# Patient Record
Sex: MALE | Race: WHITE | Employment: OTHER | URBAN - METROPOLITAN AREA
[De-identification: names, ages, dates, MRNs, and addresses within clinical notes are randomized per-mention and may not be internally consistent; named-entity substitution may affect disease eponyms.]

---

## 2018-01-18 ENCOUNTER — OFFICE VISIT (OUTPATIENT)
Dept: INTERNAL MEDICINE CLINIC | Age: 78
End: 2018-01-18

## 2018-01-18 VITALS
HEIGHT: 66 IN | HEART RATE: 68 BPM | SYSTOLIC BLOOD PRESSURE: 131 MMHG | RESPIRATION RATE: 12 BRPM | BODY MASS INDEX: 29.57 KG/M2 | OXYGEN SATURATION: 98 % | WEIGHT: 184 LBS | DIASTOLIC BLOOD PRESSURE: 76 MMHG | TEMPERATURE: 98 F

## 2018-01-18 DIAGNOSIS — Z13.220 SCREENING, LIPID: ICD-10-CM

## 2018-01-18 DIAGNOSIS — N40.1 ENLARGED PROSTATE WITH URINARY RETENTION: ICD-10-CM

## 2018-01-18 DIAGNOSIS — R33.8 ENLARGED PROSTATE WITH URINARY RETENTION: ICD-10-CM

## 2018-01-18 DIAGNOSIS — I49.3 PVC (PREMATURE VENTRICULAR CONTRACTION): ICD-10-CM

## 2018-01-18 DIAGNOSIS — E78.5 HYPERLIPIDEMIA, UNSPECIFIED HYPERLIPIDEMIA TYPE: ICD-10-CM

## 2018-01-18 DIAGNOSIS — D64.9 ANEMIA, UNSPECIFIED TYPE: Primary | ICD-10-CM

## 2018-01-18 DIAGNOSIS — D22.30 ATYPICAL NEVUS OF FACE: ICD-10-CM

## 2018-01-18 DIAGNOSIS — Z98.890 S/P MITRAL VALVE REPAIR: ICD-10-CM

## 2018-01-18 DIAGNOSIS — I34.1 MITRAL VALVE PROLAPSE: ICD-10-CM

## 2018-01-18 DIAGNOSIS — L57.8 SUN-DAMAGED SKIN: ICD-10-CM

## 2018-01-18 RX ORDER — CHOLECALCIFEROL (VITAMIN D3) 125 MCG
200 CAPSULE ORAL DAILY
COMMUNITY

## 2018-01-18 NOTE — PROGRESS NOTES
HISTORY OF PRESENT ILLNESS    New patient to my practice, referred to me by self internet. Prior medical care has been from Dr. Jamal Dockery in [place].  he works as[employer] for [employer]. was in [facility] . his  past medical history was reviewed, discussed, and summarized in the list below. Review of Systems   All other systems reviewed and are negative, except as noted in HPI      Past Medical and Surgical History  Past Medical History:   Diagnosis Date    Anemia     hx of anemia due to food intolerance    Enlarged prostate with urinary retention     s/p ?TURP 7/2017 Dr. Yamil Mtz in [place]    GERD (gastroesophageal reflux disease)     Hyperlipidemia     took simvastatin. reluctant to take medication    Mitral valve prolapse     repair 2/2012. [address] (premature ventricular contraction) 2017    saw cardiology in [place]. echo 8/2017. was referred due to presumed bradycardia at urology procedure    S/P mitral valve repair 02/2012    Sun-damaged skin     Wheat intolerance     plus [place], [place]. saw allergist.  states not gluten intolerance. has a past surgical history that includes hx mitral valvuloplasty (02/2012); hx turp (07/2017); and hx colonoscopy. Current Outpatient Prescriptions   Medication Sig    FOLIC ACID/MULTIVIT-MIN/LUTEIN (CENTRUM SILVER PO) Take  by mouth.  co-enzyme Q-10 (CO Q-10) 100 mg capsule Take 200 mg by mouth daily.  ASPIRIN (ASPIR-81 PO) Take  by mouth.  PROCYANIDOLIC OLIGOMERS (PYCNOGENOL PO) Take  by mouth.  GLUCOSAM/CHOND/COLLAGEN/HYALUR (JOINT SUPPORT PO) Take  by mouth. No current facility-administered medications for this visit. reports that he quit smoking about 48 years ago.  He has never used smokeless tobacco. He reports that he drinks about 1.2 oz of alcohol per week     family history includes Cancer in his brother; Diabetes in his brother; Heart Disease (age of onset: 46) in his father. Physical Exam   Nursing note and vitals reviewed. Blood pressure 131/76, pulse 68, temperature 98 °F (36.7 °C), resp. rate 12, height 5' 6\" (1.676 m), weight 184 lb (83.5 kg), SpO2 98 %. Constitutional: oriented to person, place, and time. No distress. Eyes: Conjunctivae are normal.   HEENT:  No cervical lymphadenopathy. No thyroid nodules or goiter  Cardiovascular: Normal rate. Regular rhythm, no murmurs, rubs. No edema  Pulmonary/Chest: Effort normal. clear to auscultation  Abdominal: soft, non-tender, non-distended  Musculoskeletal:     Neurological: Alert and oriented to person, place. Cranial nerves grossly intact. Normal gait   Skin: No rash noted. Psychiatric: Normal mood and affect. Behavior is normal.     ASSESSMENT and PLAN  Diagnoses and all orders for this visit:    1. Anemia, unspecified type - past hx. Presumed due to iron malabsorption  -     METABOLIC PANEL, COMPREHENSIVE  -     CBC WITH AUTOMATED DIFF  -     IRON PROFILE    2. Enlarged prostate with urinary retention - Controlled on current regimen. Continue current medications as written in chart. 3. Mitral valve prolapse  4. S/P mitral valve repair  Appear to be doing well. Currently asymptomatic. He would like a colonoscopy  -     [facility]    5. PVC (premature ventricular contraction)  -     METABOLIC PANEL, COMPREHENSIVE  -     [facility]    6. Screening, lipid  -     LIPID PANEL    7. Sun-damaged skin  8. Atypical nevus of face  Scaly lesion of left lateral face   -     REFERRAL TO DERMATOLOGY    9. Hyperlipidemia, unspecified hyperlipidemia type  Reluctant to start statin again.   Check labs    lab results and schedule of future lab studies reviewed with patient  reviewed medications and side effects in detail    Follow-up Disposition: Not on File

## 2018-01-18 NOTE — MR AVS SNAPSHOT
[address] [place] [address] 
 
 
 [address] [place] [address] 
[phone] Patient: Blanca Tripp MRN: [id number] ODX:3/8/1126 Visit Information Date & Time Provider Department Dept. Phone Encounter #  
 1/18/2018 11:00 AM Lois Paul MD [facility] [address] [facility] [id number] Upcoming Health Maintenance Date Due DTaP/Tdap/Td series (1 - Tdap) 8/5/1960 ZOSTER VACCINE AGE 60> 6/5/1999 GLAUCOMA SCREENING Q2Y 8/5/2004 Pneumococcal 65+ Low/Medium Risk (1 of 2 - PCV13) 8/5/2004 MEDICARE YEARLY EXAM 8/5/2004 Influenza Age 5 to Adult 8/1/2017 Allergies as of 1/18/2018  Review Complete On: 1/18/2018 By: Lois Paul MD  
  
 Severity Noted Reaction Type Reactions Pcn [Penicillins]  01/18/2018    Hives Current Immunizations  Reviewed on 1/18/2018 Name Date Influenza Vaccine 12/1/2017 Reviewed by Velasquez Young on 1/18/2018 at 11:10 AM  
You Were Diagnosed With   
  
 Codes Comments Screening, lipid    -  Primary ICD-10-CM: L44.306 ICD-9-CM: V77.91 Anemia, unspecified type     ICD-10-CM: D64.9 ICD-9-CM: 285.9 Enlarged prostate with urinary retention     ICD-10-CM: N40.1, R33.8 ICD-9-CM: 600.01, 788.20 Mitral valve prolapse     ICD-10-CM: I34.1 ICD-9-CM: 424.0 S/P mitral valve repair     ICD-10-CM: N24.172 ICD-9-CM: V45.89 PVC (premature ventricular contraction)     ICD-10-CM: I49.3 ICD-9-CM: 427.69 Sun-damaged skin     ICD-10-CM: L57.8 ICD-9-CM: 692.79 Atypical nevus of face     ICD-10-CM: D22.30 ICD-9-CM: 216.3 Vitals BP Pulse Temp Resp Height(growth percentile) Weight(growth percentile) 131/76 (BP 1 Location: Left arm, BP Patient Position: Sitting) 68 98 °F (36.7 °C) 12 5' 6\" (1.676 m) 184 lb (83.5 kg) SpO2 BMI Smoking Status 98% 29.7 kg/m2 Former Smoker Vitals History BMI and BSA Data Body Mass Index Body Surface Area 29.7 kg/m 2 1.97 m 2 [facility]erred Pharmacy Pharm[facility] Name Phone [id number] [address] [place] [address] [phone] Your Updated Medication List  
  
   
This list is accurate as of: 1/18/18 12:29 PM.  Always use your most recent med list.  
  
  
  
  
 ASPIR-81 PO Take  by mouth. CENTRUM SILVER PO Take  by mouth. co-enzyme Q-10 100 mg capsule Commonly known as:  CO Q-10 Take 200 mg by mouth daily. JOINT SUPPORT PO Take  by mouth. PYCNOGENOL PO Take  by mouth. We Performed the Following CBC WITH AUTOMATED DIFF [90196 CPT(R)] IRON PROFILE [id number] CPT(R)] LIPID PANEL [14685 CPT(R)] METABOLIC PANEL, COMPREHENSIVE [28799 CPT(R)] REFERRAL TO CARDIOLOGY [TZM35 Custom] REFERRAL TO DERMATOLOGY [REF19 Custom] Referral Information Referral ID Referred By Referred To  
  
 [id number] REGINA [facility] [phone] [address] Phone: [phone] Fax: [phone] Visits Status Start Date End Date 1 New Request 1/18/18 1/18/19 If your referral has a status of pending review or denied, additional information will be sent to support the outcome of this decision. Referral ID Referred By Referred To  
 [id number] Moreno TAPIA MD  
   [address] Phone: [phone] Fax: [phone] Visits Status Start Date End Date 1 New Request 1/18/18 1/18/19 If your referral has a status of pending review or denied, additional information will be sent to support the outcome of this decision. Introducing [facility]! Jacinto Mon introduces [employer] patient portal. Now you can access parts of your medical record, email your doctor's office, and request medication refills online. 1. In your internet browser, go to [email or web address] 2. Click on the First Time User? Click Here link in the Sign In box. You will see the New Member Sign Up page. 3. Enter your [employer] Access Code exactly as it appears below. You will not need to use this code after youve completed the sign-up process. If you do not sign up before the expiration date, you must request a new code. · [employer] Access Code: [email or web address] Expires: 4/18/2018 11:01 AM 
 
4. Enter the last four digits of your Social Security Number (xxxx) and Date of Birth (mm/dd/yyyy) as indicated and click Submit. You will be taken to the next sign-up page. 5. Create a [employer] ID. This will be your [employer] login ID and cannot be changed, so think of one that is secure and easy to remember. 6. Create a [employer] password. You can change your password at any time. 7. Enter your Password Reset Question and Answer. This can be used at a later time if you forget your password. 8. Enter your e-mail address. You will receive e-mail notification when new information is available in [address] 9. Click Sign Up. You can now view and download portions of your medical record. 10. Click the Download Summary menu link to download a portable copy of your medical information. If you have questions, please visit the Frequently Asked Questions section of the [employer] website. Remember, [employer] is NOT to be used for urgent needs. For medical emergencies, dial 911. Now available from your iPhone and Android! Please provide this summary of care documentation to your next provider. Your primary care clinician is listed as Karol Lane. If you have any questions after today's visit, please call [phone].

## 2018-01-23 ENCOUNTER — HOSPITAL ENCOUNTER (OUTPATIENT)
Dept: LAB | Age: 78
Discharge: HOME | End: 2018-01-23
Payer: MEDICARE

## 2018-01-23 PROCEDURE — 80061 LIPID PANEL: CPT

## 2018-01-23 PROCEDURE — 85025 COMPLETE CBC W/AUTO DIFF WBC: CPT

## 2018-01-23 PROCEDURE — 80053 COMPREHEN METABOLIC PANEL: CPT

## 2018-01-23 PROCEDURE — 36415 COLL VENOUS BLD VENIPUNCTURE: CPT

## 2018-01-23 PROCEDURE — 83550 IRON BINDING TEST: CPT

## 2018-01-24 LAB
ALBUMIN SERPL-MCNC: 4.3 G/DL (ref 3.5–4.8)
ALBUMIN/GLOB SERPL: 1.8 {RATIO} (ref 1.2–2.2)
ALP SERPL-CCNC: 63 IU/L (ref 39–117)
ALT SERPL-CCNC: 15 IU/L (ref 0–44)
AST SERPL-CCNC: 17 IU/L (ref 0–40)
BASOPHILS # BLD AUTO: 0 X10E3/UL (ref 0–0.2)
BASOPHILS NFR BLD AUTO: 0 %
BILIRUB SERPL-MCNC: 0.6 MG/DL (ref 0–1.2)
BUN SERPL-MCNC: 20 MG/DL (ref 8–27)
BUN/CREAT SERPL: 16 (ref 10–24)
CALCIUM SERPL-MCNC: 9 MG/DL (ref 8.6–10.2)
CHLORIDE SERPL-SCNC: 100 MMOL/L (ref 96–106)
CHOLEST SERPL-MCNC: 280 MG/DL (ref 100–199)
CO2 SERPL-SCNC: 25 MMOL/L (ref 18–29)
CREAT SERPL-MCNC: 1.23 MG/DL (ref 0.76–1.27)
EOSINOPHIL # BLD AUTO: 0.2 X10E3/UL (ref 0–0.4)
EOSINOPHIL NFR BLD AUTO: 3 %
ERYTHROCYTE [DISTWIDTH] IN BLOOD BY AUTOMATED COUNT: 14.1 % (ref 12.3–15.4)
GLOBULIN SER CALC-MCNC: 2.4 G/DL (ref 1.5–4.5)
GLUCOSE SERPL-MCNC: 102 MG/DL (ref 65–99)
HCT VFR BLD AUTO: 41.8 % (ref 37.5–51)
HDLC SERPL-MCNC: 47 MG/DL
HGB BLD-MCNC: 14 G/DL (ref 13–17.7)
IMM GRANULOCYTES # BLD: 0 X10E3/UL (ref 0–0.1)
IMM GRANULOCYTES NFR BLD: 0 %
INTERPRETATION, 910389: NORMAL
INTERPRETATION: NORMAL
IRON SATN MFR SERPL: 40 % (ref 15–55)
IRON SERPL-MCNC: 120 UG/DL (ref 38–169)
LDLC SERPL CALC-MCNC: 210 MG/DL (ref 0–99)
LYMPHOCYTES # BLD AUTO: 3.6 X10E3/UL (ref 0.7–3.1)
LYMPHOCYTES NFR BLD AUTO: 51 %
MCH RBC QN AUTO: 31.8 PG (ref 26.6–33)
MCHC RBC AUTO-ENTMCNC: 33.5 G/DL (ref 31.5–35.7)
MCV RBC AUTO: 95 FL (ref 79–97)
MONOCYTES # BLD AUTO: 0.6 X10E3/UL (ref 0.1–0.9)
MONOCYTES NFR BLD AUTO: 9 %
NEUTROPHILS # BLD AUTO: 2.6 X10E3/UL (ref 1.4–7)
NEUTROPHILS NFR BLD AUTO: 37 %
PDF IMAGE, 910387: NORMAL
PLATELET # BLD AUTO: 270 X10E3/UL (ref 150–379)
POTASSIUM SERPL-SCNC: 5 MMOL/L (ref 3.5–5.2)
PROT SERPL-MCNC: 6.7 G/DL (ref 6–8.5)
RBC # BLD AUTO: 4.4 X10E6/UL (ref 4.14–5.8)
SODIUM SERPL-SCNC: 137 MMOL/L (ref 134–144)
TIBC SERPL-MCNC: 297 UG/DL (ref 250–450)
TRIGL SERPL-MCNC: 113 MG/DL (ref 0–149)
UIBC SERPL-MCNC: 177 UG/DL (ref 111–343)
VLDLC SERPL CALC-MCNC: 23 MG/DL (ref 5–40)
WBC # BLD AUTO: 6.9 X10E3/UL (ref 3.4–10.8)

## 2018-02-14 ENCOUNTER — OFFICE VISIT (OUTPATIENT)
Dept: CARDIOLOGY CLINIC | Age: 78
End: 2018-02-14

## 2018-02-14 VITALS
SYSTOLIC BLOOD PRESSURE: 118 MMHG | HEIGHT: 66 IN | HEART RATE: 72 BPM | BODY MASS INDEX: 28.93 KG/M2 | WEIGHT: 180 LBS | DIASTOLIC BLOOD PRESSURE: 70 MMHG

## 2018-02-14 DIAGNOSIS — I34.0 NON-RHEUMATIC MITRAL REGURGITATION: Primary | ICD-10-CM

## 2018-02-14 DIAGNOSIS — Z98.890 S/P MITRAL VALVE REPAIR: ICD-10-CM

## 2018-02-14 DIAGNOSIS — E78.5 DYSLIPIDEMIA: ICD-10-CM

## 2018-02-14 DIAGNOSIS — I34.1 MITRAL VALVE PROLAPSE: ICD-10-CM

## 2018-02-14 RX ORDER — ADHESIVE BANDAGE
15 BANDAGE TOPICAL DAILY
COMMUNITY

## 2018-02-14 NOTE — PROGRESS NOTES
Visit Vitals    /70    Pulse 72    Ht 5' 6\" (1.676 m)    Wt 180 lb (81.6 kg)    BMI 29.05 kg/m2

## 2018-02-14 NOTE — PROGRESS NOTES
Gael Melara MD [facility]  [address], [facility], [address] [facility]    Office [phone]  Fax [phone]  Cell [phone]        Karol Tinsley is a 65 y.o. male referred for evaluation and management of mitral valve disease. Consult requested by Heraclio Landeros MD.      Assessment  Encounter Diagnoses   Name Primary?  S/P mitral valve repair     Non-rheumatic mitral regurgitation Yes    Dyslipidemia        Recommendations:    Kaorl Tinsley has MVP with MR s/p repair 2012. He has no murmur of MR on exam and has no exertional sxs. No hx of AF. Will obtain records from Dr. Chico James in [place]. Update echo in 6 months and then annually thereafter. He has a familiar dyslipidemia and has been on and off statins for years. He reports digestive sxs at higher doses. It would be reasonable to resume statin therapy and perhaps couple it with Zetia. Defer to Dr. Geovanny Guzman. Follow-up Disposition:  Return in about 6 months (around 8/14/2018). Subjective:    He underwent mitral valve repair at the [facility] 2012 due to severe MR/MVP. No records available. He states cath at that time demonstrated no plaque. He was on cholesterol medication at that time. He last underwent echo by Dr. Azucena Kern in [place]. He denies any AF before or after MVR. He feels well overall. He plays golf 3 times a week and does housework without any exertional sxs. Patient denies any exertional chest pain, dyspnea, palpitations, syncope, orthopnea, edema or paroxysmal nocturnal dyspnea. He notes he had digestive issues with higher doses of statins in the past.     Patient recently moved from [place]. Cardiac risk factors   HTN no  DM no  Smoking no    Cardiac testing  No specialty comments available.     Past Medical History:   Diagnosis Date    Anemia     hx of anemia due to food intolerance    Enlarged prostate with urinary retention     s/p ?TURP 7/2017 Dr. Herminia Rodas in [address] [place] [address] GERD (gastroesophageal reflux disease)     Hyperlipidemia     took simvastatin. reluctant to take medication    Hyperlipidemia     Mitral valve prolapse     repair 2/2012. [address] (premature ventricular contraction) 2017    saw cardiology in [place]. echo 8/2017. was referred due to presumed bradycardia at urology procedure    S/P mitral valve repair 02/2012    Sun-damaged skin     Wheat intolerance     plus [place], [place]. saw allergist.  states not gluten intolerance. Current Outpatient Prescriptions   Medication Sig Dispense Refill    magnesium hydroxide (CHAO MILK OF MAGNESIA) 400 mg/5 mL suspension Take 15 mL by mouth daily.  FOLIC ACID/MULTIVIT-MIN/LUTEIN (CENTRUM SILVER PO) Take  by mouth.  co-enzyme Q-10 (CO Q-10) 100 mg capsule Take 200 mg by mouth daily.  ASPIRIN (ASPIR-81 PO) Take  by mouth.  PROCYANIDOLIC OLIGOMERS (PYCNOGENOL PO) Take  by mouth.  GLUCOSAM/CHOND/COLLAGEN/HYALUR (JOINT SUPPORT PO) Take  by mouth. Allergies   Allergen Reactions    Other Food Other (comments)     Grains- constipations    Beef Containing Products Other (comments)     constipation    Lactose Other (comments)     constipation      Pcn [Penicillins] Hives          Review of Systems  Constitutional: Negative for fever, chills, malaise/fatigue and diaphoresis. Respiratory: Negative for cough, hemoptysis, sputum production, shortness of breath and wheezing. Cardiovascular: Negative for chest pain, palpitations, orthopnea, claudication, leg swelling and PND. Gastrointestinal: Negative for heartburn, nausea, vomiting, blood in stool and melena. Genitourinary: Negative for dysuria and flank pain. Musculoskeletal: Negative for joint pain and back pain. Skin: Negative for rash. Neurological: Negative for focal weakness, seizures, loss of consciousness, weakness and headaches.   Endo/Heme/Allergies: Does not bruise/bleed easily. Psychiatric/Behavioral: Negative for memory loss. The patient does not have insomnia. Physical Exam    Visit Vitals    /70    Pulse 72    Ht 5' 6\" (1.676 m)    Wt 180 lb (81.6 kg)    BMI 29.05 kg/m2     Wt Readings from Last 3 Encounters:   02/14/18 180 lb (81.6 kg)   01/18/18 184 lb (83.5 kg)      General - well developed well nourished  Neck - JVP normal, thyroid nl  Cardiac - normal S1, S2, no murmurs, rubs or gallops.  No clicks  Vascular - carotids without bruits, radials, femorals and pedal pulses equal bilateral  Lungs - clear to auscultation bilaterals, no rales, wheezing or rhonchi  Abd - soft nontender, no HSM, no abd bruits  Extremities - no edema  Skin - no rash  Neuro - nonfocal  Psych - normal mood and affect      Cardiographics  EKG 2/14/18- SR, normal    Written by Alfie Mora, as dictated by Caleb Pate MD.  Jimmie Varela

## 2018-02-14 NOTE — MR AVS SNAPSHOT
[address] [phone] Patient: Sari Estrada MRN: [id number] EAI:[birth year] Visit Information Date & Time Provider Department Dept. Phone Encounter #  
 2/14/2018 10:00 AM Lynne Alonso MD [facility] [phone] [id number] Follow-up Instructions Return in about 6 months (around 8/14/2018). Your Appointments 3/7/2018  8:30 AM  
Office Visit with ALLI Valiente [address] [facility]) Appt Note: NP skin exam left side of face . .mailed paperwork [address] [address] [address] [facility] [id number] [place] [address] [address] [facility] [address] Upcoming Health Maintenance Date Due DTaP/Tdap/Td series (1 - Tdap) 8/5/1960 ZOSTER VACCINE AGE 60> 6/5/1999 GLAUCOMA SCREENING Q2Y 8/5/2004 Pneumococcal 65+ Low/Medium Risk (1 of 2 - PCV13) 8/5/2004 MEDICARE YEARLY EXAM 8/5/2004 Allergies as of 2/14/2018  Review Complete On: 2/14/2018 By: Candice Dunne LPN Severity Noted Reaction Type Reactions Other Food  02/14/2018    Other (comments) Grains- constipations Beef Containing Products  02/14/2018    Other (comments)  
 constipation Lactose  02/14/2018    Other (comments)  
 constipation Pcn [Penicillins]  01/18/2018    Hives Current Immunizations  Reviewed on 1/18/2018 Name Date Influenza Vaccine 12/1/2017 Not reviewed this visit You Were Diagnosed With   
  
 Codes Comments PVC's (premature ventricular contractions)    -  Primary ICD-10-CM: I49.3 ICD-9-CM: 427.69 Mitral valve prolapse     ICD-10-CM: I34.1 ICD-9-CM: 424.0 Hyperlipidemia, unspecified hyperlipidemia type     ICD-10-CM: E78.5 ICD-9-CM: 272.4 S/P mitral valve repair     ICD-10-CM: Q95.226 ICD-9-CM: V45.89 Vitals BP Pulse Height(growth percentile) Weight(growth percentile) BMI Smoking Status 118/70 72 5' 6\" (1.676 m) 180 lb (81.6 kg) 29.05 kg/m2 Former Smoker Vitals History BMI and BSA Data Body Mass Index Body Surface Area 29.05 kg/m 2 1.95 m 2 Preferred Pharmacy Pharmacy Name Phone [id number] [address] [place] [address] [phone] Your Updated Medication List  
  
   
This list is accurate as of: 2/14/18 10:40 AM.  Always use your most recent med list.  
  
  
  
  
 ASPIR-81 PO Take  by mouth. CENTRUM SILVER PO Take  by mouth. co-enzyme Q-10 100 mg capsule Commonly known as:  CO Q-10 Take 200 mg by mouth daily. JOINT SUPPORT PO Take  by mouth. CHAO MILK OF MAGNESIA 400 mg/5 mL suspension Generic drug:  magnesium hydroxide Take 15 mL by mouth daily. PYCNOGENOL PO Take  by mouth. We Performed the Following AMB POC EKG ROUTINE W/ 12 LEADS, INTER & REP [56570 CPT(R)] Follow-up Instructions Return in about 6 months (around 8/14/2018). Introducing hospitals [facility]! Dear Esthela Estrada: Thank you for requesting a [employer] account. Our records indicate that you already have an active [employer] account. You can access your account anytime at [email or web address]. [email or web address] Did you know that you can access your hospital and ER discharge instructions at any time in [employer]ociety? You can also review all of your test results from your hospital stay or ER visit. Additional Information If you have questions, please visit the Frequently Asked Questions section of the [employer] website at [email or web address] Remember, [employer] is NOT to be used for urgent needs. For medical emergencies, dial 911. Now available from your iPhone and Android! Please provide this summary of care documentation to your next provider. Your primary care clinician is listed as Karol Lane. If you have any questions after today's visit, please call [phone].

## 2018-03-07 ENCOUNTER — OFFICE VISIT (OUTPATIENT)
Dept: DERMATOLOGY | Facility: AMBULATORY SURGERY CENTER | Age: 78
End: 2018-03-07

## 2018-03-07 ENCOUNTER — HOSPITAL ENCOUNTER (OUTPATIENT)
Dept: LAB | Age: 78
Discharge: HOME | End: 2018-03-07

## 2018-03-07 VITALS
HEART RATE: 69 BPM | BODY MASS INDEX: 28.93 KG/M2 | WEIGHT: 180 LBS | HEIGHT: 66 IN | DIASTOLIC BLOOD PRESSURE: 78 MMHG | SYSTOLIC BLOOD PRESSURE: 130 MMHG | OXYGEN SATURATION: 97 % | RESPIRATION RATE: 16 BRPM | TEMPERATURE: 98.4 F

## 2018-03-07 DIAGNOSIS — D22.9 MULTIPLE BENIGN NEVI: ICD-10-CM

## 2018-03-07 DIAGNOSIS — L82.1 OTHER SEBORRHEIC KERATOSIS: ICD-10-CM

## 2018-03-07 DIAGNOSIS — L57.8 SUN-DAMAGED SKIN: Primary | ICD-10-CM

## 2018-03-07 DIAGNOSIS — L57.0 ACTINIC KERATOSIS: ICD-10-CM

## 2018-03-07 DIAGNOSIS — D18.01 CHERRY ANGIOMA: ICD-10-CM

## 2018-03-07 DIAGNOSIS — D48.5 NEOPLASM OF UNCERTAIN BEHAVIOR OF SKIN OF BACK: ICD-10-CM

## 2018-03-07 DIAGNOSIS — L81.4 LENTIGINES: ICD-10-CM

## 2018-03-07 NOTE — PROGRESS NOTES
Written by Eduardo Church, as dictated by Juve Wood, [address]. Name: Catarina Adair       Age: 65 y.o. Date: 3/7/2018    Chief Complaint:   Chief Complaint   Patient presents with    Skin Exam       Subjective:     HPI:  Mr.. Catarina Adair is a 65 y.o. male who presents for the evaluation of a lesion on the left lateral cheek. The patient was referred by Dr. Chante White for this evaluation. He states that the lesion is raised during the summer time. The lesion has been present for a couple years. The patient has not had prior treatment for this lesion. Associated symptoms include pain. ROS: Consitutional: Negative  Dermatological : positive for - skin lesion changes      Social History     Social History    Marital status:      Spouse name: Paulina Her Number of children: 3    Years of education: N/A     Occupational History    Not on file. Social History Main Topics    Smoking status: Former Smoker     Quit date: 1970    Smokeless tobacco: Never Used    Alcohol use 1.2 oz/week     2 Glasses of wine per week    Drug use: Not on file    Sexual activity: Not on file     Other Topics Concern    Not on file     Social History Narrative       Family History   Problem Relation Age of Onset    Heart Disease Father 46      age 46    [de-identified] Brother      kidney    Diabetes Brother        Past Medical History:   Diagnosis Date    Anemia     hx of anemia due to food intolerance    Enlarged prostate with urinary retention     s/p ?TURP 2017 Dr. Cynthia Mcgowan in [place]    GERD (gastroesophageal reflux disease)     Hyperlipidemia     took simvastatin. reluctant to take medication    Hyperlipidemia     Mitral valve prolapse     repair [address]. [address] (premature ventricular contraction) 2017    saw cardiology in [place]. echo 2017.  was referred due to presumed bradycardia at urology procedure    S/P mitral valve repair 2012    Sun-damaged skin     Wheat intolerance     plus Simon, Barley. saw allergist.  states not gluten intolerance. Past Surgical History:   Procedure Laterality Date    HX COLONOSCOPY      reports done [id number] range in [place]. Dr. Andrew Sanchez  02/2012    [place]ral [place] [facility] [place] Cody Favorite    HX TURP  07/2017    laser? Dr. Aly Boyer       Current Outpatient Prescriptions   Medication Sig Dispense Refill    magnesium hydroxide (CHAO MILK OF MAGNESIA) 400 mg/5 mL suspension Take 15 mL by mouth daily.  FOLIC ACID/MULTIVIT-MIN/LUTEIN (CENTRUM SILVER PO) Take  by mouth.  co-enzyme Q-10 (CO Q-10) 100 mg capsule Take 200 mg by mouth daily.  ASPIRIN (ASPIR-81 PO) Take  by mouth.  PROCYANIDOLIC OLIGOMERS (PYCNOGENOL PO) Take  by mouth.  GLUCOSAM/CHOND/COLLAGEN/HYALUR (JOINT SUPPORT PO) Take  by mouth. Allergies   Allergen Reactions    Other Food Other (comments)     Grains- constipations    Beef Containing Products Other (comments)     constipation    Lactose Other (comments)     constipation      Pcn [Penicillins] Hives         Objective:    Visit Vitals    /78 (BP 1 Location: Right arm, BP Patient Position: Sitting)    Pulse 69    Temp 98.4 °F (36.9 °C) (Oral)    Resp 16    Ht 5' 6\" (1.676 m)    Wt 180 lb (81.6 kg)    SpO2 97%    BMI 29.05 kg/m2       Rico Hunter is a 65 y.o. male who appears well and in no distress. He is awake, alert, and oriented. There is no preauricular, submandibular, or cervical lymphadenopathy. A limited skin examination was completed including his face, ears, neck, back, chest, abdomen, and upper extremities. He has sun damage on his face and ears - pink scaly diffuse patches consistent with actinic keratoses. There is no definitive lesion on the left cheek. There are scattered waxy macules and keratotic papules consistent with seborrheic keratoses.  He has a 1.5 cm darkly pigmented keratotic papule on his left upper back consistent with an inflamed seborrheic keratosis. There are pink intradermal nevi and brown junctional nevi, no concerning features. There are lentigines on sun exposed areas. He has scattered red papules consistent with cherry angiomas. Assessment/Plan:    Sun damage, actinic keratoses, face and ears. The diagnosis was discussed. We discussed the options of Efudex or PDT. He has elected to proceed with PDT today. He will repeat this in 4 weeks as well. Seborrheic keratoses. The diagnosis was reviewed and the patient was reassured that no treatment is needed for these benign lesions. Neoplasm of Uncertain Behavior, left upper back, favor inflamed SK. The differential diagnoses were discussed. A shave removal was advised to address this lesion. The procedure was reviewed and verbal and written consent were obtained. The risks of pain, bleeding, infection, recurrence and scar were discussed. I performed the procedure. The site was cleansed and anesthetized with 1% Lidocaine with Epinephrine 1:100,000. A shave removal was performed to remove the lesion in its clinical entirety. Drysol was used for hemostasis. The wound was bandaged and care reviewed. The specimen was sent to pathology. I will contact the patient with the results and any further treatment that may be necessary. Normal nevi. The diagnosis of normal nevi was reviewed. I discussed sun protection, sunscreen use, the warning signs of skin cancer, the need for self-skin examinations, and the need for regular practitioner exams every 1 year. The patient should follow up sooner as needed if new, changing, or symptomatic skin lesions arise. Solar lentigos. The diagnosis and relationship to sun exposure was reviewed. Sun protection advised. Cherry angiomas. The diagnosis was reviewed and the patient was reassured that no treatment is needed for these benign lesions.     This plan was reviewed with the patient and patient agrees. All questions were answered. This scribe documentation was reviewed by me and accurately reflects the examination and decisions made by me. [facility]   OFFICE PROCEDURE PROGRESS NOTE   Chart reviewed for the following:   Renzo EVANS, have reviewed the History, Physical and updated the Allergic reactions for Ely Jacinto. TIME OUT performed immediately prior to start of procedure:   Dana EVANS, have performed the following reviews on Ely Jacinto   prior to the start of the procedure:     * Patient was identified by name and date of birth   * Agreement on procedure being performed was verified   * Risks and Benefits explained to the patient   * Procedure site verified and marked as necessary   * Patient was positioned for comfort   * Verbal consent was obtained. Time: 8:54 AM   Date of procedure: 3/7/2018  Procedure performed by: Mildred Cm  Provider assisted by: Greg Barrera LPN    Patient assisted by: self   How tolerated by patient: tolerated the procedure well with no complications   Comments: none            Chief Complaint   Patient presents with    Skin Exam       Visit Vitals    /78 (BP 1 Location: Right arm, BP Patient Position: Sitting)    Pulse 69    Temp 98.4 °F (36.9 °C) (Oral)    Resp 16    Ht 5' 6\" (1.676 m)    Wt 81.6 kg (180 lb)    SpO2 97%    BMI 29.05 kg/m2       Ely Jacinto was seen today for Mauricio-U therapy of actinic keratoses located on the face and ears. The treatment and post-procedure care were reviewed, verbal and written consent were obtained. The skin was prepped in the usual manner using alcohol wipes to degrease the surface. The Levulan was applied to the face and ears, and incubated for 90 minutes. After 90 minutes the skin was illuminated with Mauricio-U light for 1000 seconds. Eye protection was used.   Ely Jacinto experienced moderate symptoms of burnign during illumination. A fan was used during the procedure to assist in the reduction or tolerance of symptoms. Sunscreen was applied to the skin, a hat was worn, and Yulissa Estrada was discharged in good condition. Follow up will be in 4 weeks for round 2. [facility]   OFFICE PROCEDURE PROGRESS NOTE   Chart reviewed for the following:   Renzo EVANS, have reviewed the History, Physical and updated the Allergic reactions for Yulissa Estrada. TIME OUT performed immediately prior to start of procedure:   Dana EVANS, have performed the following reviews on Yulissa Baconlist   prior to the start of the procedure:     * Patient was identified by name and date of birth   * Agreement on procedure being performed was verified   * Risks and Benefits explained to the patient   * Procedure site verified and marked as necessary   * Patient was positioned for comfort   * Consent was signed and verified     Time: 2929  Date of procedure: 3/7/2018  Procedure performed by: Charly Rand.  Fabiola Juan  Provider assisted by: lpn   Patient assisted by: self   How tolerated by patient: tolerated the procedure well with no complications   Comments: none

## 2018-03-07 NOTE — MR AVS SNAPSHOT
[address] [facility] [address] [place] [address] [address] 
[phone] Patient: Ayaan Callaway MRN: [id number] CSD:0/7/2303 Visit Information Date & Time Provider Department Dept. Phone Encounter #  
 3/7/2018  8:30 AM ALLI Barton [phone] Your Appointments 8/27/2018  9:00 AM  
ECHO CARDIOGRAMS 2D with ECHO, STFRANCIS  
[facility] (QASIM SCHEDULING) Appt Note: 6 mo fup echo at 9 at 10 20 dr Cody Oh Roberto 600 [facility] [address]  
[phone]  
  
   
 [address] [place] [address]  
  
    
 8/27/2018 10:20 AM  
ESTABLISHED PATIENT with Brady Saavedra MD  
[facility] ([address]) Appt Note: 6 mo fup echo at 9 at 10 20 dr Cody Oh Roberto [address] [place] [address] Upcoming Health Maintenance Date Due DTaP/Tdap/Td series (1 - Tdap) 8/5/1960 ZOSTER VACCINE AGE 60> 6/5/1999 GLAUCOMA SCREENING Q2Y 8/5/2004 Pneumococcal 65+ Low/Medium Risk (1 of 2 - PCV13) 8/5/2004 MEDICARE YEARLY EXAM 8/5/2004 Allergies as of 3/7/2018  Review Complete On: 3/7/2018 By: Madelaine Tracy LPN Severity Noted Reaction Type Reactions Other Food  02/14/2018    Other (comments) Grains- constipations Beef Containing Products  02/14/2018    Other (comments)  
 constipation Lactose  02/14/2018    Other (comments)  
 constipation Pcn [Penicillins]  01/18/2018    Hives Current Immunizations  Reviewed on 1/18/2018 Name Date Influenza Vaccine 12/1/2017 Not reviewed this visit Vitals BP Pulse Temp Resp Height(growth percentile) Weight(growth percentile)  130/78 (BP 1 Location: Right arm, BP Patient Position: Sitting) 69 98.4 °F (36.9 °C) (Oral) 16 5' 6\" (1.676 m) 180 lb (81.6 kg) SpO2 BMI Smoking Status 97% 29.05 kg/m2 Former Smoker BMI and BSA Data Body Mass Index Body Surface Area 29.05 kg/m 2 1.95 m 2 Preferred Pharmacy Pharmacy Name Phone [id number] Am[address] [phone] Your Updated Medication List  
  
   
This list is accurate as of 3/7/18  8:31 AM.  Always use your most recent med list.  
  
  
  
  
 ASPIR-81 PO Take  by mouth. CENTRUM SILVER PO Take  by mouth. co-enzyme Q-10 100 mg capsule Commonly known as:  CO Q-10 Take 200 mg by mouth daily. JOINT SUPPORT PO Take  by mouth. CHAO MILK OF MAGNESIA 400 mg/5 mL suspension Generic drug:  magnesium hydroxide Take 15 mL by mouth daily. PYCNOGENOL PO Take  by mouth. Patient Instructions Self Skin Exam and Sunscreens Early detection and treatment is essential in the treatment of all forms of skin cancer. If caught early, all forms of skin cancer are curable. In addition to your regular visits, you should perform a monthly skin examination. Over time, you become familiar with what is normally found on your skin and can identify new or suspicious spots. One of the screening tools you can use to assess your skin is to follow the ABCDEs: 
 
A= Asymmetry (One half is unlike the other half) B= Border (An irregular, scalloped or poorly defined edge) C= Color (Is varied from one area to another, has shades of tan, brown/ black,       white, red or blue) D= Diameter (Spots larger than 6mm or a pencil eraser) E= Evolving (New spots or one that is changing in size, shape, or color) A follow- up interval will be customized based on your history of skin cancer or level of skin damage and risk factors. In any case, if you notice a suspicious or new spot, an appointment should be arranged between regular visits. Everyone should use sunscreen and sun-safe practices, which is especially important for those with a personal or family history of skin cancer. Suggestions for this include: 1. Use daily moisturizers containing SPF 30 or higher. 2. Wear long sleeve clothing with UPF ratings and a broad-brimmed hat. 3. Apply sunscreen with SPF 30 or higher to all sun exposed areas if you are going to be in the sun. A broad spectrum UVA/ UVB sunscreen is best.  Dont forget to REAPPLY every two hours or more often if swimming or sweating! 4. Avoid outside activities during peak sun hours, especially in the summer (10am- 2pm). 5. DO NOT use tanning beds. Using sunscreen and sun-safe practices can help reduce the likelihood of developing skin cancer or additional skin cancers in those previously diagnosed. Introducing [facility]! Dear Palmer Muller: Thank you for requesting a [employer] account. Our records indicate that you already have an active [employer] account. You can access your account anytime at [email or web address] Did you know that you can access your hospital and ER discharge instructions at any time in [employer]? You can also review all of your test results from your hospital stay or ER visit. Additional Information If you have questions, please visit the Frequently Asked Questions section of the [employer] website at [email or web address]. Remember, [employer] is NOT to be used for urgent needs. For medical emergencies, dial 911. Now available from your iPhone and Android! Please provide this summary of care documentation to your next provider. Your primary care clinician is listed as [facility]. If you have any questions after today's visit, please call [phone].

## 2018-04-18 ENCOUNTER — OFFICE VISIT (OUTPATIENT)
Dept: DERMATOLOGY | Facility: AMBULATORY SURGERY CENTER | Age: 78
End: 2018-04-18

## 2018-04-18 VITALS
DIASTOLIC BLOOD PRESSURE: 74 MMHG | TEMPERATURE: 98.2 F | RESPIRATION RATE: 14 BRPM | HEART RATE: 86 BPM | OXYGEN SATURATION: 98 % | SYSTOLIC BLOOD PRESSURE: 128 MMHG

## 2018-04-18 DIAGNOSIS — L57.0 ACTINIC KERATOSIS: Primary | ICD-10-CM

## 2018-04-18 NOTE — PROGRESS NOTES
Chief Complaint   Patient presents with    Other     mauricio light       Visit Vitals    /74    Pulse 86    Temp 98.2 °F (36.8 °C)    Resp 14    SpO2 98%       Matthew Kaba was seen today for Mauricio-U therapy of actinic keratoses located on the face and ears. The treatment and post-procedure care were reviewed, verbal and written consent were obtained. The skin was prepped in the usual manner using alcohol wipes to degrease the surface. The Levulan was applied to the face and ears, and incubated for 90 minutes. After 90 minutes the skin was illuminated with Mauricio-U light for 1000 seconds. Eye protection was used. Matthew Kaba experienced moderate symptoms of burning during illumination. A fan was used during the procedure to assist in the reduction or tolerance of symptoms. Sunscreen was applied to the skin, a hat was worn, and Matthew Kaba was discharged in good condition. Follow up will be at next exam.        [address] [facility]   OFFICE PROCEDURE PROGRESS NOTE   Chart reviewed for the following:   Renzo EVANS, have reviewed the History, Physical and updated the Allergic reactions for Matthew Kaba. TIME OUT performed immediately prior to start of procedure:   Dana EVANS, have performed the following reviews on Melvinaertjay jay Kaba   prior to the start of the procedure:     * Patient was identified by name and date of birth   * Agreement on procedure being performed was verified   * Risks and Benefits explained to the patient   * Procedure site verified and marked as necessary   * Patient was positioned for comfort   * Consent was signed and verified     Time: 5500  Date of procedure: 4/18/2018  Procedure performed by: Colletta Romans.  Nikkie Tarango  Provider assisted by: lpn   Patient assisted by: self   How tolerated by patient: tolerated the procedure well with no complications   Comments: none

## 2018-05-29 ENCOUNTER — OFFICE VISIT (OUTPATIENT)
Dept: DERMATOLOGY | Facility: AMBULATORY SURGERY CENTER | Age: 78
End: 2018-05-29

## 2018-05-29 VITALS
SYSTOLIC BLOOD PRESSURE: 120 MMHG | BODY MASS INDEX: 28.93 KG/M2 | WEIGHT: 180 LBS | HEART RATE: 69 BPM | HEIGHT: 66 IN | OXYGEN SATURATION: 97 % | TEMPERATURE: 98.5 F | DIASTOLIC BLOOD PRESSURE: 78 MMHG | RESPIRATION RATE: 18 BRPM

## 2018-05-29 DIAGNOSIS — D22.9 MULTIPLE BENIGN NEVI: ICD-10-CM

## 2018-05-29 DIAGNOSIS — D18.01 CHERRY ANGIOMA: ICD-10-CM

## 2018-05-29 DIAGNOSIS — L82.0 INFLAMED SEBORRHEIC KERATOSIS: ICD-10-CM

## 2018-05-29 DIAGNOSIS — L82.1 SEBORRHEIC KERATOSES: ICD-10-CM

## 2018-05-29 DIAGNOSIS — L57.0 ACTINIC KERATOSIS: Primary | ICD-10-CM

## 2018-05-29 NOTE — MR AVS SNAPSHOT
[address] [facility] [address] [place] [address] [address] 
[phone] Patient: Claudeen Calkins MRN: [id number] QIR:2/2/3198 Visit Information Date & Time Provider Department Dept. Phone Encounter #  
 5/29/2018  9:00 AM Oly Veliz NP [facility] [phone] [id number] Your Appointments 8/27/2018  9:00 AM  
ECHO CARDIOGRAMS 2D with ECHO, STFRANCIS  
[facility] (QASIM SCHEDULING) Appt Note: 6 mo fup echo at 9 at 10 20 dr Evan Smith Roberto 600 [facility] [address]  
[phone]  
  
   
 [address] [address]  
  
    
 8/27/2018 10:20 AM  
ESTABLISHED PATIENT with Charlie Medina MD  
[facility] ([facility]) Appt Note: 6 mo fup echo at 9 at 10 20 dr Evan Smith Roberto 600 [address] [address]  
[phone]  
  
   
 [address] [address]  
  
    
 9/12/2018  2:15 PM  
Office Visit with Oly Veliz NP [facility]) Appt Note: 3rd Mauricio-u treatment [facility] [address] [facility] [address]  
[address] [facility] [address] [place] [address] Upcoming Health Maintenance Date Due DTaP/Tdap/Td series (1 - Tdap) 8/5/1960 ZOSTER VACCINE AGE 60> 6/5/1999 GLAUCOMA SCREENING Q2Y 8/5/2004 Pneumococcal 65+ Low/Medium Risk (1 of 2 - PCV13) 8/5/2004 MEDICARE YEARLY EXAM 3/14/2018 Influenza Age 5 to Adult 8/1/2018 Allergies as of 5/29/2018  Review Complete On: 5/29/2018 By: Gloria Alanis Severity Noted Reaction Type Reactions Other Food  02/14/2018    Other (comments) Grains- constipations Beef Containing Products  02/14/2018    Other (comments)  
 constipation Lactose  02/14/2018    Other (comments) constipation Pcn [Penicillins]  01/18/2018    Hives Current Immunizations  Reviewed on 1/18/2018 Name Date Influenza Vaccine 12/1/2017 Not reviewed this visit Vitals BP Pulse Temp Resp Height(growth percentile) Weight(growth percentile) 120/78 (BP 1 Location: Left arm, BP Patient Position: Sitting) 69 98.5 °F (36.9 °C) (Oral) 18 5' 6\" (1.676 m) 180 lb (81.6 kg) SpO2 BMI Smoking Status 97% 29.05 kg/m2 Former Smoker Vitals History BMI and BSA Data Body Mass Index Body Surface Area 29.05 kg/m 2 1.95 m 2 Preferred Pharmacy Pharmacy Name Phone [id number] Lucho Blake [address] Your Updated Medication List  
  
   
This list is accurate as of 5/29/18  9:07 AM.  Always use your most recent med list.  
  
  
  
  
 ASPIR-81 PO Take  by mouth. CENTRUM SILVER PO Take  by mouth. co-enzyme Q-10 100 mg capsule Commonly known as:  CO Q-10 Take 200 mg by mouth daily. JOINT SUPPORT PO Take  by mouth. CHAO MILK OF MAGNESIA 400 mg/5 mL suspension Generic drug:  magnesium hydroxide Take 15 mL by mouth daily. PYCNOGENOL PO Take  by mouth. Patient Instructions Self Skin Exam and Sunscreens Early detection and treatment is essential in the treatment of all forms of skin cancer. If caught early, all forms of skin cancer are curable. In addition to your regular visits, you should perform a monthly skin examination. Over time, you become familiar with what is normally found on your skin and can identify new or suspicious spots. One of the screening tools you can use to assess your skin is to follow the ABCDEs: 
 
A= Asymmetry (One half is unlike the other half) B= Border (An irregular, scalloped or poorly defined edge) C= Color (Is varied from one area to another, has shades of tan, brown/ black,       white, red or blue) D= Diameter (Spots larger than 6mm or a pencil eraser) E= Evolving (New spots or one that is changing in size, shape, or color) A follow- up interval will be customized based on your history of skin cancer or level of skin damage and risk factors. In any case, if you notice a suspicious or new spot, an appointment should be arranged between regular visits. Everyone should use sunscreen and sun-safe practices, which is especially important for those with a personal or family history of skin cancer. Suggestions for this include: 1. Use daily moisturizers containing SPF 30 or higher. 2. Wear long sleeve clothing with UPF ratings and a broad-brimmed hat. 3. Apply sunscreen with SPF 30 or higher to all sun exposed areas if you are going to be in the sun. A broad spectrum UVA/ UVB sunscreen is best.  Dont forget to REAPPLY every two hours or more often if swimming or sweating! 4. Avoid outside activities during peak sun hours, especially in the summer (10am- 2pm). 5. DO NOT use tanning beds. Using sunscreen and sun-safe practices can help reduce the likelihood of developing skin cancer or additional skin cancers in those previously diagnosed. Introducing [facility]! Dear Sabrina Olivera: Thank you for requesting a [employer] account. Our records indicate that you already have an active [employer] account. You can access your account anytime at [email or web address] Did you know that you can access your hospital and ER discharge instructions at any time in [employer]? You can also review all of your test results from your hospital stay or ER visit. Additional Information If you have questions, please visit the Frequently Asked Questions section of the [employer] website at [email or web address] Remember, [employer] is NOT to be used for urgent needs. For medical emergencies, dial 911. Now available from your iPhone and Android! Please provide this summary of care documentation to your next provider. Your primary care clinician is listed as Haydee Hallman. If you have any questions after today's visit, please call [phone].

## 2018-05-29 NOTE — PROGRESS NOTES
Chief Complaint   Patient presents with    Skin Exam     spots on lower back/sides     1. Have you been to the ER, urgent care clinic since your last visit? Hospitalized since your last visit? No    2. Have you seen or consulted any other health care providers outside of the [address] [place] [address] since your last visit? Include any pap smears or colon screening. [facility] 5/2018.

## 2018-05-29 NOTE — PROGRESS NOTES
Name: Ken Saul       Age: 65 y.o. Date: 2018    Chief Complaint:   Chief Complaint   Patient presents with    Skin Exam     spots on lower back/sides       Subjective:    HPI  Mr. Ken Saul is a 65 y.o. male who presents for a skin exam.  The patient's last skin exam was a few months ago and then he underwent two rounds of PDT. He is very happy with the results. He notes residual possible lesion in the right ear. The patient notes scaly lesions on his back that he would like treated. The patient's pertinent skin history includes : AK s/p PDT 3/18,     ROS: Constitutional: Negative. Dermatological : positive for skin lesion changes      Social History     Social History    Marital status:      Spouse name: Alexandro Guzman Number of children: 3    Years of education: N/A     Occupational History    Not on file. Social History Main Topics    Smoking status: Former Smoker     Quit date: 1970    Smokeless tobacco: Never Used    Alcohol use 1.2 oz/week     2 Glasses of wine per week    Drug use: Not on file    Sexual activity: Not on file     Other Topics Concern    Not on file     Social History Narrative       Family History   Problem Relation Age of Onset    Heart Disease Father 46      age 46    [de-identified] Brother      kidney    Diabetes Brother        Past Medical History:   Diagnosis Date    Anemia     hx of anemia due to food intolerance    Enlarged prostate with urinary retention     s/p ?TURP 2017 Dr. Mg Celis in [place]    GERD (gastroesophageal reflux disease)     Hyperlipidemia     took simvastatin. reluctant to take medication    Hyperlipidemia     Mitral valve prolapse     repair [address]. [address] (premature ventricular contraction) 2017    saw cardiology in [place]. echo 2017.  was referred due to presumed bradycardia at urology procedure    S/P mitral valve repair 2012    Sun-damaged skin     Wheat intolerance     plus [place], [place]. saw allergist.  states not gluten intolerance. Past Surgical History:   Procedure Laterality Date    HX COLONOSCOPY      reports done [id number] range in [address] Dr. Chan Flank  02/2012    mitral valve repair, [facility] Dr. Monserrat Shah    HX TURP  07/2017    laser? Dr. Hailey Davis       Current Outpatient Prescriptions   Medication Sig Dispense Refill    magnesium hydroxide (CHAO MILK OF MAGNESIA) 400 mg/5 mL suspension Take 15 mL by mouth daily.  FOLIC ACID/MULTIVIT-MIN/LUTEIN (CENTRUM SILVER PO) Take  by mouth.  co-enzyme Q-10 (CO Q-10) 100 mg capsule Take 200 mg by mouth daily.  ASPIRIN (ASPIR-81 PO) Take  by mouth.  PROCYANIDOLIC OLIGOMERS (PYCNOGENOL PO) Take  by mouth.  GLUCOSAM/CHOND/COLLAGEN/HYALUR (JOINT SUPPORT PO) Take  by mouth. Allergies   Allergen Reactions    Other Food Other (comments)     Grains- constipations    Beef Containing Products Other (comments)     constipation    Lactose Other (comments)     constipation      Pcn [Penicillins] Hives         Objective:    Visit Vitals    /78 (BP 1 Location: Left arm, BP Patient Position: Sitting)    Pulse 69    Temp 98.5 °F (36.9 °C) (Oral)    Resp 18    Ht 5' 6\" (1.676 m)    Wt 81.6 kg (180 lb)    SpO2 97%    BMI 29.05 kg/m2       Charlotte Stiles is a 65 y.o. male who appears well and in no distress. He is awake, alert, and oriented. There is no preauricular, submandibular, or cervical lymphadenopathy. A skin examination was performed including his scalp, face (including eyelids), ears, neck, chest, back, abdomen, upper extremities (including digits/nails), breast.  There is been a vast improvement in the number of actinic keratoses noted on the prior examination. He has residual actinic keratosis noted on the left tragus and left earlobe as well as the right antihelix.   His scattered stuck on waxy macules and keratotic papules consistent with seborrheic keratoses, including a few on his back that are partially excoriated. There is a well-healed scar in the left upper back with no evidence of lesion recurrence. There are scattered cherry angiomas. He has medium brown junctional and pink intradermal nevi without concerning features for severe atypia. Assessment/Plan:  1. Actinic Keratoses. The diagnosis of this precancerous lesion related to sun exposure was reviewed. Verbal consent was obtained. I treated 3 lesions with cryotherapy and post-cryotherapy care was reviewed. 2.Seborrheic keratoses. The diagnosis was reviewed and the patient was reassured that no treatment is needed for these benign lesions. 3.Normal nevi. The diagnosis of normal nevi was reviewed. I discussed sun protection, sunscreen use, the warning signs of skin cancer, the need for self-skin examinations, and the need for regular practitioner exams every 1 year. The patient should follow up sooner as needed if new, changing, or symptomatic skin lesions arise. 4. Cherry angiomas. The diagnosis was reviewed and the patient was reassured that no treatment is needed for these benign lesions. 5.Inflamed seborrheic keratoses. The diagnosis and treatment with liquid nitrogen cryotherapy were reviewed. The risk or persistence or recurrence of the keratosis and the potential for pigment change at the treated site were reviewed. Verbal consent was obtained. I treated 5 lesions with cryotherapy and care was reviewed. [facility]   OFFICE PROCEDURE PROGRESS NOTE   Chart reviewed for the following:   Renzo EVANS, have reviewed the History, Physical and updated the Allergic reactions for Tasha Lock. TIME OUT performed immediately prior to start of procedure:   Dana EVANS, have performed the following reviews on Tasha Lock   prior to the start of the procedure:     * Patient was identified by name and date of birth   * Agreement on procedure being performed was verified   * Risks and Benefits explained to the patient   * Procedure site verified and marked as necessary   * Patient was positioned for comfort   * Verbal consent was obtained. Time: 0920  Date of procedure: 5/29/2018  Procedure performed by: Rose Fabry.  Roxy Carbajal DNP  Provider assisted by: none   Patient assisted by: self   How tolerated by patient: tolerated the procedure well with no complications   Comments: none

## 2018-08-27 ENCOUNTER — OFFICE VISIT (OUTPATIENT)
Dept: CARDIOLOGY CLINIC | Age: 78
End: 2018-08-27

## 2018-08-27 ENCOUNTER — CLINICAL SUPPORT (OUTPATIENT)
Dept: CARDIOLOGY CLINIC | Age: 78
End: 2018-08-27

## 2018-08-27 VITALS
HEIGHT: 66 IN | DIASTOLIC BLOOD PRESSURE: 78 MMHG | HEART RATE: 56 BPM | SYSTOLIC BLOOD PRESSURE: 118 MMHG | WEIGHT: 181 LBS | OXYGEN SATURATION: 96 % | RESPIRATION RATE: 16 BRPM | BODY MASS INDEX: 29.09 KG/M2

## 2018-08-27 DIAGNOSIS — E78.5 DYSLIPIDEMIA: ICD-10-CM

## 2018-08-27 DIAGNOSIS — I34.1 MITRAL VALVE PROLAPSE: ICD-10-CM

## 2018-08-27 DIAGNOSIS — Z98.890 S/P MITRAL VALVE REPAIR: ICD-10-CM

## 2018-08-27 DIAGNOSIS — I34.0 NON-RHEUMATIC MITRAL REGURGITATION: ICD-10-CM

## 2018-08-27 DIAGNOSIS — Z78.9 STATIN INTOLERANCE: ICD-10-CM

## 2018-08-27 DIAGNOSIS — I34.0 NON-RHEUMATIC MITRAL REGURGITATION: Primary | ICD-10-CM

## 2018-08-27 NOTE — MR AVS SNAPSHOT
[address] [facility] [address] 1007 [facility] 
[phone] Patient: Edgar Becerra MRN: [id number] PHC:5/1/2557 Visit Information Date & Time Provider Department Dept. Phone Encounter #  
 8/27/2018 10:20 AM Isabel Gutiérrez MD [facility] Becky Jesus [phone] [id number] Follow-up Instructions Return in about 1 year (around 8/27/2019). Your Appointments 8/27/2018 10:20 AM  
ESTABLISHED PATIENT with Isable Gutiérrez MD  
[facility] ([facility]) Appt Note: 6 mo fup echo at 9 at 10 20 dr Sandoval [facility] 600 [facility] [address]  
[phone]  
  
   
 [address] [place] [address]  
  
    
 8/30/2018  2:15 PM  
ACUTE CARE with Kelly Marin MD  
[facility] ([facility]) Appt Note: f/u from seeing his heart dr Princess Stinson [address]  
  
   
 [phone]  
  
    
 1/7/2019 10:00 AM  
Office Visit with ALLI Barros [address] [facility]) Appt Note: est.pt full skin exam  
 [facility] [address]  
[address] [place] [id number] Upcoming Health Maintenance Date Due DTaP/Tdap/Td series (1 - Tdap) 8/5/1960 ZOSTER VACCINE AGE 60> 6/5/1999 GLAUCOMA SCREENING Q2Y 8/5/2004 Pneumococcal 65+ Low/Medium Risk (1 of 2 - PCV13) 8/5/2004 MEDICARE YEARLY EXAM 3/14/2018 Influenza Age 5 to Adult 8/1/2018 Allergies as of 8/27/2018  Review Complete On: 8/27/2018 By: Vesta Abreu RN Severity Noted Reaction Type Reactions Other Food  02/14/2018    Other (comments) Grains- constipations Beef Containing Products  02/14/2018    Other (comments) constipation Lactose  02/14/2018    Other (comments)  
 constipation Pcn [Penicillins]  01/18/2018    Hives Current Immunizations  Reviewed on 1/18/2018 Name Date Influenza Vaccine 12/1/2017 Not reviewed this visit You Were Diagnosed With   
  
 Codes Comments S/P mitral valve repair    -  Primary ICD-10-CM: H10.442 ICD-9-CM: V45.89 Non-rheumatic mitral regurgitation     ICD-10-CM: I34.0 ICD-9-CM: 424.0 Dyslipidemia     ICD-10-CM: E78.5 ICD-9-CM: 272.4 Mitral valve prolapse     ICD-10-CM: I34.1 ICD-9-CM: 424.0 Vitals BP Pulse Resp Height(growth percentile) Weight(growth percentile) SpO2  
 118/78 (BP 1 Location: Left arm, BP Patient Position: Sitting) (!) 56 16 5' 6\" (1.676 m) 181 lb (82.1 kg) 96% BMI Smoking Status 29.21 kg/m2 Former Smoker BMI and BSA Data Body Mass Index Body Surface Area  
 29.21 kg/m 2 1.96 m 2 Preferred Pharmacy Pharmacy Name Phone [id number] [address]andes [address] [facility] Your Updated Medication List  
  
   
This list is accurate as of 8/27/18 10:15 AM.  Always use your most recent med list.  
  
  
  
  
 ASPIR-81 PO Take  by mouth. co-enzyme Q-10 100 mg capsule Commonly known as:  CO Q-10 Take 200 mg by mouth daily. JOINT SUPPORT PO Take  by mouth. CHAO MILK OF MAGNESIA 400 mg/5 mL suspension Generic drug:  magnesium hydroxide Take 15 mL by mouth daily. PYCNOGENOL PO Take  by mouth. Follow-up Instructions Return in about 1 year (around 8/27/2019). Introducing [facility]! Dear Narda Quinones: Thank you for requesting a [employer] account. Our records indicate that you already have an active [employer] account. You can access your account anytime at [email or web address] "[email or web address] Did you know that you can access your hospital and ER discharge instructions at any time in [facility]? You can also review all of your test results from your hospital stay or ER visit. Additional Information If you have questions, please visit the Frequently Asked Questions section of the [facility] website at [email or web address] Remember, [facility] is NOT to be used for urgent needs. For medical emergencies, dial 911. Now available from your iPhone and Android! Please provide this summary of care documentation to your next provider. Your primary care clinician is listed as Amaury Cary. If you have any questions after today's visit, please call [phone].

## 2018-08-27 NOTE — PROGRESS NOTES
Chief Complaint   Patient presents with    Cholesterol Problem    Other     PVCs, MVP     Visit Vitals    /78 (BP 1 Location: Left arm, BP Patient Position: Sitting)    Pulse (!) 56    Resp 16    Ht 5' 6\" (1.676 m)    Wt 181 lb (82.1 kg)    SpO2 96%    BMI 29.21 kg/m2     Pt presents in office w/o complaint.

## 2018-08-27 NOTE — PROGRESS NOTES
Gael BURTON [address]  [address],  [address]  [place], [address] [facility]    Office [phone]  Fax [phone]  Cell [phone]        Carolyn Damon is a 77 y.o. male. Last seen 6 months ago. Assessment  Encounter Diagnoses   Name Primary?  S/P mitral valve repair     Non-rheumatic mitral regurgitation Yes    Dyslipidemia     Mitral valve prolapse     Statin intolerance        Recommendations:  Carolyn Damon has MVP with MR s/p repair 2012. Echo today demonstrates mild-moderate residual MR, but he has no murmur on exam. No Hx of AF. Repeat echo in one year. Familial dyslipidemia, severe statin intolerance. LDL-c from 1/2018 was 210. Apparently, he had no CAD by pre-op cath in 2012. At the age of 77, I am loath to re-challenge him with statin therapy or PCSK9i. Follow-up Disposition:  Return in about 1 year (around 8/27/2019). Subjective:  Mr. Morales Coburn states he is doing very well. He is active playing golf a few times per week, with no exertional symptoms. He also moved to a new house in [place] in 6/2018. He reports some fatigue, which he attributes to problems with his digestive track. He is followed by Arden Handy MD. He states during his last appointment he was told his LDL was elevated. He did not tolerate Zocor well in the past, because it worsened his fatigue. Patient denies any exertional chest pain, dyspnea, palpitations, syncope, orthopnea, edema or paroxysmal nocturnal dyspnea. Cardiac risk factors   HTN no  DM no  Smoking no    Cardiac testing  Echo 8/27/18 - EF 60%, s/p MV repair, mild-moderate excentric MR, anteriorly directed    Past Medical History:   Diagnosis Date    AK (actinic keratosis) 2018    Dr. Rigo Pulliam.   blue light 2018    Anemia     hx of anemia due to food intolerance    Enlarged prostate with urinary retention     s/p ?TURP 7/2017 Dr. Trang Harp in [address] [place] [address] 2 GERD (gastroesophageal reflux disease)     Hyperlipidemia     took simvastatin. reluctant to take medication    Mitral valve prolapse     repair 2/[facility]. [facility]    Orchitis 2018    e coli. Dr. Yamilet James, Dr. Pamela Mcknight (premature ventricular contraction) 2017    saw cardiology in [facility]. echo 8/2017. was referred due to presumed bradycardia at urology procedure    S/P mitral valve repair 02/2012    Statin intolerance 8/30/2018    Sun-damaged skin     Wheat intolerance     plus [place], [place]. saw allergist.  states not gluten intolerance. Current Outpatient Prescriptions   Medication Sig Dispense Refill    magnesium hydroxide (CHAO MILK OF MAGNESIA) 400 mg/5 mL suspension Take 15 mL by mouth daily.  co-enzyme Q-10 (CO Q-10) 100 mg capsule Take 200 mg by mouth daily.  ASPIRIN (ASPIR-81 PO) Take 81 mg by mouth daily.  PROCYANIDOLIC OLIGOMERS (PYCNOGENOL PO) Take 1 Tab by mouth daily. Allergies   Allergen Reactions    Other Food Other (comments)     Grains- constipations    Beef Containing Products Other (comments)     constipation    Lactose Other (comments)     constipation      Pcn [Penicillins] Hives    Statins-Hmg-Coa Reductase Inhibitors Other (comments)     Declines statin due to concern of fatigue, hx of gut issues          Review of Systems  Constitutional: Negative for fever, chills, and diaphoresis. +fatigue  Respiratory: Negative for cough, hemoptysis, sputum production, shortness of breath and wheezing. Cardiovascular: Negative for chest pain, palpitations, orthopnea, claudication, leg swelling and PND. Gastrointestinal: Negative for heartburn, nausea, vomiting, blood in stool and melena. Genitourinary: Negative for dysuria and flank pain. Musculoskeletal: Negative for joint pain and back pain. Skin: Negative for rash.   Neurological: Negative for focal weakness, seizures, loss of consciousness, weakness and headaches. Endo/Heme/Allergies: Does not bruise/bleed easily. Psychiatric/Behavioral: Negative for memory loss. The patient does not have insomnia. Physical Exam    Visit Vitals    /78 (BP 1 Location: Left arm, BP Patient Position: Sitting)    Pulse (!) 56    Resp 16    Ht 5' 6\" (1.676 m)    Wt 181 lb (82.1 kg)    SpO2 96%    BMI 29.21 kg/m2     Wt Readings from Last 3 Encounters:   08/30/18 185 lb (83.9 kg)   08/27/18 181 lb (82.1 kg)   05/29/18 180 lb (81.6 kg)      General - well developed well nourished  Neck - JVP normal, thyroid nl  Cardiac - normal S1, S2, no murmurs, rubs or gallops. No clicks  Vascular - carotids without bruits, radials, femorals and pedal pulses equal bilateral  Lungs - clear to auscultation bilaterals, no rales, wheezing or rhonchi  Abd - soft nontender, no HSM, no abd bruits  Extremities - no edema  Skin - no rash  Neuro - nonfocal  Psych - normal mood and affect      Cardiographics  EKG 2/14/18- SR, normal  Echo 8/27/18 - EF 60%, s/p MV repair, mild-moderate excentric MR, anteriorly directed    Written by Aamir Vance, as dictated by Dr. Isabel Gutiérrez.      Isabel Gutiérrez MD

## 2018-08-30 ENCOUNTER — OFFICE VISIT (OUTPATIENT)
Dept: INTERNAL MEDICINE CLINIC | Age: 78
End: 2018-08-30

## 2018-08-30 ENCOUNTER — HOSPITAL ENCOUNTER (OUTPATIENT)
Dept: LAB | Age: 78
Discharge: HOME | End: 2018-08-30
Payer: MEDICARE

## 2018-08-30 VITALS
HEART RATE: 71 BPM | DIASTOLIC BLOOD PRESSURE: 81 MMHG | OXYGEN SATURATION: 96 % | TEMPERATURE: 98.2 F | WEIGHT: 185 LBS | RESPIRATION RATE: 18 BRPM | BODY MASS INDEX: 29.73 KG/M2 | HEIGHT: 66 IN | SYSTOLIC BLOOD PRESSURE: 122 MMHG

## 2018-08-30 DIAGNOSIS — R53.82 CHRONIC FATIGUE: ICD-10-CM

## 2018-08-30 DIAGNOSIS — E78.5 DYSLIPIDEMIA: Primary | ICD-10-CM

## 2018-08-30 DIAGNOSIS — Z74.09 DECREASED FUNCTIONAL MOBILITY AND ENDURANCE: ICD-10-CM

## 2018-08-30 DIAGNOSIS — Z78.9 STATIN INTOLERANCE: ICD-10-CM

## 2018-08-30 PROBLEM — L57.0 AK (ACTINIC KERATOSIS): Status: ACTIVE | Noted: 2018-01-01

## 2018-08-30 PROCEDURE — 36415 COLL VENOUS BLD VENIPUNCTURE: CPT

## 2018-08-30 PROCEDURE — 82306 VITAMIN D 25 HYDROXY: CPT

## 2018-08-30 PROCEDURE — 84443 ASSAY THYROID STIM HORMONE: CPT

## 2018-08-30 PROCEDURE — 85027 COMPLETE CBC AUTOMATED: CPT

## 2018-08-30 PROCEDURE — 84403 ASSAY OF TOTAL TESTOSTERONE: CPT

## 2018-08-30 PROCEDURE — 80048 BASIC METABOLIC PNL TOTAL CA: CPT

## 2018-08-30 NOTE — PROGRESS NOTES
HISTORY OF PRESENT ILLNESS    Chief Complaint   Patient presents with    Advice Only     f/u to cardiologist visit       Presents for follow-up    Seeing Freida Clemens for AK  Is very happy w blue light treatment. Saw Dr. John Mcgraw in urology at [address] [place] [address] and was found to have an e coli infection w  testicular swelling and orchitis  Then saw Dr Kaci Valentine as well. Was taking Monolauren coconut oil until spring 2018 and stopped it. He feels this causes risk of infection. Hyperlipidemia  Taking no meds. Took simvastatin, other statins which helped numbers, but he did not want to re-start it. Feels that statins caused \"digestive issues\" since he has wheat intolerance, leaky gut syndrome  He feels his stamina is not as high as others. Feels he must intake more carbs than others to maintain energy. Feels that statins caused that as well. No TIA's, no chest pain on exertion, no dyspnea on exertion, no swelling of ankles. Lab Results   Component Value Date/Time    Cholesterol, total 280 (H) 01/23/2018 10:04 AM    HDL Cholesterol 47 01/23/2018 10:04 AM    LDL, calculated 210 (H) 01/23/2018 10:04 AM    VLDL, calculated 23 01/23/2018 10:04 AM    Triglyceride 113 01/23/2018 10:04 AM         Review of Systems   All other systems reviewed and are negative, except as noted in HPI    Past Medical and Surgical History   has a past medical history of Anemia; Enlarged prostate with urinary retention; GERD (gastroesophageal reflux disease); Hyperlipidemia; Hyperlipidemia; Mitral valve prolapse; PVC (premature ventricular contraction) (2017); S/P mitral valve repair (02/2012); Sun-damaged skin; and Wheat intolerance. He also has no past medical history of Arsenic suspected exposure; Family history of skin cancer; Radiation exposure; Skin cancer; Sunburn, blistering; or Tanning bed exposure. has a past surgical history that includes hx mitral valvuloplasty (02/2012); hx turp (07/2017); and hx colonoscopy. reports that he quit smoking about 48 years ago. He has never used smokeless tobacco. He reports that he drinks about 1.2 oz of alcohol per week   family history includes Cancer in his brother; Diabetes in his brother; Heart Disease (age of onset: 46) in his father. Physical Exam   Nursing note and vitals reviewed. Blood pressure 122/81, pulse 71, temperature 98.2 °F (36.8 °C), temperature source Oral, resp. rate 18, height 5' 6\" (1.676 m), weight 185 lb (83.9 kg), SpO2 96 %. Constitutional:  No distress. Eyes: Conjunctivae are normal.   Ears:  Hearing grossly intact  Cardiovascular: Normal rate. regular rhythm, no murmurs or gallops  No edema  Pulmonary/Chest: Effort normal.   CTAB  Musculoskeletal: moves all 4 extremities   Neurological: Alert and oriented to person, place, and time. Skin: No rash noted. Psychiatric: Normal mood and affect. Behavior is normal.     ASSESSMENT and PLAN  Diagnoses and all orders for this visit:    1. Dyslipidemia  2. Statin intolerance  His LDL is extremely high. Took statins in the past and has somatic intolerances. Declines additional statin therapy. He has no history of cardiovascular events. Could consider Zetia. He also discussed with cardiology the possibility of taking Repatha. Patient declines any medication at this time. 3. Decreased functional mobility and endurance  4. Chronic fatigue  Feels that he is chronically unable to exercise without taking a break or having food, unlike some of his contemporaries. Check labs. He consult with cardiology.   Could consider stress test if symptoms seem to be worsening although they have been stable for a while.  -     TSH 3RD GENERATION  -     TESTOSTERONE, FREE+TOTAL  -     VITAMIN D, 25 HYDROXY  -     CBC W/O DIFF  -     METABOLIC PANEL, BASIC      lab results and schedule of future lab studies reviewed with patient  reviewed medications and side effects in detail    Return to clinic for further evaluation if new symptoms develop    Follow-up Disposition: Not on File    Current Outpatient Prescriptions   Medication Sig    magnesium hydroxide (CHAO MILK OF MAGNESIA) 400 mg/5 mL suspension Take 15 mL by mouth daily.  co-enzyme Q-10 (CO Q-10) 100 mg capsule Take 200 mg by mouth daily.  ASPIRIN (ASPIR-81 PO) Take 81 mg by mouth daily.  PROCYANIDOLIC OLIGOMERS (PYCNOGENOL PO) Take 1 Tab by mouth daily. No current facility-administered medications for this visit.

## 2018-08-30 NOTE — PROGRESS NOTES
Justin Moscoso is a 77 y.o. male. Last seen 6 months ago. Assessment  Encounter Diagnoses   Name Primary?  S/P mitral valve repair     Non-rheumatic mitral regurgitation Yes    Dyslipidemia     Mitral valve prolapse     Statin intolerance        Recommendations:  Justin Moscoso has MVP with MR s/p repair 2012. Echo today demonstrates mild-moderate residual MR, but he has no murmur on exam. No Hx of AF. Repeat echo in one year. Familial dyslipidemia, severe statin intolerance. LDL-c from 1/2018 was 210. Apparently, he had no CAD by pre-op cath in 2012. At the age of 77, I am loath to re-challenge him with statin therapy or PCSK9i. Follow-up Disposition:  Return in about 1 year (around 8/27/2019). Subjective:  Mr. Peña Gonzales states he is doing very well. He is active playing golf a few times per week, with no exertional symptoms. He also moved to a new house in [place], [place] 6/[place]. He reports some fatigue, which he attributes to problems with his digestive track. He is followed by Pat Ruiz MD. He states during his last appointment he was told his LDL was elevated. He did not tolerate Zocor well in the past, because it worsened his fatigue. Patient denies any exertional chest pain, dyspnea, palpitations, syncope, orthopnea, edema or paroxysmal nocturnal dyspnea. Cardiac risk factors   HTN no  DM no  Smoking no    Cardiac testing  Echo 8/27/18 - EF 60%, s/p MV repair, mild-moderate excentric MR, anteriorly directed    Past Medical History:   Diagnosis Date    AK (actinic keratosis) 2018    Dr. Joe Landrum. blue light 2018    Anemia     hx of anemia due to food intolerance    Enlarged prostate with urinary retention     s/p ?TURP 7/2017 Dr. Delon Oh in [place]    GERD (gastroesophageal reflux disease)     Hyperlipidemia     took simvastatin. reluctant to take medication    Mitral valve prolapse     repair 2/2012.   [address] Orchitis 2018 e coli. Dr. Brett Ibrahim, Dr. Bijan Stevens (premature ventricular contraction) 2017    saw cardiology in [place]. echo 8/2017. was referred due to presumed bradycardia at urology procedure    S/P mitral valve repair 02/2012    Statin intolerance 8/30/2018    Sun-damaged skin     Wheat intolerance     plus Meraux, Barley. saw allergist.  states not gluten intolerance. Current Outpatient Prescriptions   Medication Sig Dispense Refill    magnesium hydroxide (CHAO MILK OF MAGNESIA) 400 mg/5 mL suspension Take 15 mL by mouth daily.  co-enzyme Q-10 (CO Q-10) 100 mg capsule Take 200 mg by mouth daily.  ASPIRIN (ASPIR-81 PO) Take 81 mg by mouth daily.  PROCYANIDOLIC OLIGOMERS (PYCNOGENOL PO) Take 1 Tab by mouth daily. Allergies   Allergen Reactions    Other Food Other (comments)     Grains- constipations    Beef Containing Products Other (comments)     constipation    Lactose Other (comments)     constipation      Pcn [Penicillins] Hives    Statins-Hmg-Coa Reductase Inhibitors Other (comments)     Declines statin due to concern of fatigue, hx of gut issues          Review of Systems  Constitutional: Negative for fever, chills, and diaphoresis. +fatigue  Respiratory: Negative for cough, hemoptysis, sputum production, shortness of breath and wheezing. Cardiovascular: Negative for chest pain, palpitations, orthopnea, claudication, leg swelling and PND. Gastrointestinal: Negative for heartburn, nausea, vomiting, blood in stool and melena. Genitourinary: Negative for dysuria and flank pain. Musculoskeletal: Negative for joint pain and back pain. Skin: Negative for rash. Neurological: Negative for focal weakness, seizures, loss of consciousness, weakness and headaches. Endo/Heme/Allergies: Does not bruise/bleed easily. Psychiatric/Behavioral: Negative for memory loss. The patient does not have insomnia.       Physical Exam    Visit Vitals    /78 (BP 1 Location: Left arm, BP Patient Position: Sitting)    Pulse (!) 56    Resp 16    Ht 5' 6\" (1.676 m)    Wt 181 lb (82.1 kg)    SpO2 96%    BMI 29.21 kg/m2     Wt Readings from Last 3 Encounters:   08/30/18 185 lb (83.9 kg)   08/27/18 181 lb (82.1 kg)   05/29/18 180 lb (81.6 kg)      General - well developed well nourished  Neck - JVP normal, thyroid nl  Cardiac - normal S1, S2, no murmurs, rubs or gallops. No clicks  Vascular - carotids without bruits, radials, femorals and pedal pulses equal bilateral  Lungs - clear to auscultation bilaterals, no rales, wheezing or rhonchi  Abd - soft nontender, no HSM, no abd bruits  Extremities - no edema  Skin - no rash  Neuro - nonfocal  Psych - normal mood and affect      Cardiographics  EKG 2/14/18- SR, normal  Echo 8/27/18 - EF 60%, s/p MV repair, mild-moderate excentric MR, anteriorly directed    Written by Janee Alvarez, as dictated by Dr. Diane Armendariz.      Diane Armendariz MD

## 2018-08-30 NOTE — MR AVS SNAPSHOT
[address] [facility] 
 
 
 [address] [facility] [place] [address] [facility] 
[phone] Patient: Saray Godinez MRN: [id number] Visit Information Date & Time Provider Department Dept. Phone Encounter #  
 8/30/2018  2:15 PM Karlee Duke MD [facility] [address] [id number] Your Appointments 1/7/2019 10:00 AM  
Office Visit with ALLI Juan [address] [facility]) Appt Note: est.pt full skin exam  
 [facility] [place] [address] [address] [facility] [address]  
[phone]  
  
   
 [address] [address]  
  
    
 9/9/2019  9:00 AM  
ESTABLISHED PATIENT with WILLIE SWENSON  
[facility] ([facility]) Appt Note: annual echo at 9 at 10 dr Yaa Josue [address] [facility] [address] [place] [address]  
[phone]  
  
   
 [address] [address]  
  
    
 9/9/2019 10:00 AM  
ESTABLISHED PATIENT with Vini Gutiérrez MD  
[facility] ([facility]) Appt Note: annual echo at 9 at 10 dr Yaa Josue [address] [facility] 600 [address] [facility]  
[address] [facility] [place] [address] [address] Upcoming Health Maintenance Date Due DTaP/Tdap/Td series (1 - Tdap) 8/5/1960 ZOSTER VACCINE AGE 60> 6/5/1999 GLAUCOMA SCREENING Q2Y 8/5/2004 Pneumococcal 65+ Low/Medium Risk (1 of 2 - PCV13) 8/5/2004 MEDICARE YEARLY EXAM 3/14/2018 Influenza Age 5 to Adult 8/1/2018 Allergies as of 8/30/2018  Review Complete On: 8/30/2018 By: Karlee Duke MD  
  
 Severity Noted Reaction Type Reactions Other Food  02/14/2018    Other (comments) Grains- constipations Beef Containing Products  02/14/2018    Other (comments)  
 constipation Lactose  02/14/2018    Other (comments) constipation Pcn [Penicillins]  01/18/2018    Hives Statins-hmg-coa Reductase Inhibitors  08/30/2018    Other (comments) Declines statin due to concern of fatigue, hx of gut issues Current Immunizations  Reviewed on 8/30/2018 Name Date Influenza Vaccine 8/28/2018, 12/1/2017 Reviewed by Michael Kaur MD on 8/30/2018 at  2:58 PM  
You Were Diagnosed With   
  
 Codes Comments Dyslipidemia    -  Primary ICD-10-CM: E78.5 ICD-9-CM: 272.4 Statin intolerance     ICD-10-CM: Z78.9 ICD-9-CM: 995.27 Decreased functional mobility and endurance     ICD-10-CM: Z74.09 
ICD-9-CM: 780.99 Chronic fatigue     ICD-10-CM: R53.82 
ICD-9-CM: 780.79 Vitals BP Pulse Temp Resp Height(growth percentile) Weight(growth percentile) 122/81 (BP 1 Location: Left arm, BP Patient Position: Sitting) 71 98.2 °F (36.8 °C) (Oral) 18 5' 6\" (1.676 m) 185 lb (83.9 kg) SpO2 BMI Smoking Status 96% 29.86 kg/m2 Former Smoker Vitals History BMI and BSA Data Body Mass Index Body Surface Area  
 29.86 kg/m 2 1.98 m 2 Preferred Pharmacy Pharmacy Name Phone [id number] [address] [facility] [address] [facility] Your Updated Medication List  
  
   
This list is accurate as of 8/30/18  3:00 PM.  Always use your most recent med list.  
  
  
  
  
 ASPIR-81 PO Take 81 mg by mouth daily. co-enzyme Q-10 100 mg capsule Commonly known as:  CO Q-10 Take 200 mg by mouth daily. CHAO MILK OF MAGNESIA 400 mg/5 mL suspension Generic drug:  magnesium hydroxide Take 15 mL by mouth daily. PYCNOGENOL PO Take 1 Tab by mouth daily. We Performed the Following CBC W/O DIFF [70667 CPT(R)] METABOLIC PANEL, BASIC [79161 CPT(R)] TESTOSTERONE, FREE+TOTAL [CZJ44210 Custom] TSH 3RD GENERATION [66406 CPT(R)] VITAMIN D, 25 HYDROXY [id number] CPT(R)] Introducing [facility]! Dear Bryan Reveles: Thank you for requesting a [employer] account. Our records indicate that you already have an active [employer] account. You can access your account anytime at [email or web address] Did you know that you can access your hospital and ER discharge instructions at any time in [employer]? You can also review all of your test results from your hospital stay or ER visit. Additional Information If you have questions, please visit the Frequently Asked Questions section of the [employer] website at [email or web address] Remember, [employer] is NOT to be used for urgent needs. For medical emergencies, dial 911. Now available from your iPhone and Android! Please provide this summary of care documentation to your next provider. Your primary care clinician is listed as Cathie Lakhani. If you have any questions after today's visit, please call [phone].

## 2018-09-02 LAB
25(OH)D3+25(OH)D2 SERPL-MCNC: 27.8 NG/ML (ref 30–100)
BUN SERPL-MCNC: 20 MG/DL (ref 8–27)
BUN/CREAT SERPL: 16 (ref 10–24)
CALCIUM SERPL-MCNC: 8.5 MG/DL (ref 8.6–10.2)
CHLORIDE SERPL-SCNC: 102 MMOL/L (ref 96–106)
CO2 SERPL-SCNC: 24 MMOL/L (ref 20–29)
CREAT SERPL-MCNC: 1.26 MG/DL (ref 0.76–1.27)
ERYTHROCYTE [DISTWIDTH] IN BLOOD BY AUTOMATED COUNT: 13.6 % (ref 12.3–15.4)
GLUCOSE SERPL-MCNC: 92 MG/DL (ref 65–99)
HCT VFR BLD AUTO: 40.6 % (ref 37.5–51)
HGB BLD-MCNC: 13.5 G/DL (ref 13–17.7)
INTERPRETATION: NORMAL
MCH RBC QN AUTO: 32.7 PG (ref 26.6–33)
MCHC RBC AUTO-ENTMCNC: 33.3 G/DL (ref 31.5–35.7)
MCV RBC AUTO: 98 FL (ref 79–97)
PLATELET # BLD AUTO: 219 X10E3/UL (ref 150–379)
POTASSIUM SERPL-SCNC: 4.4 MMOL/L (ref 3.5–5.2)
RBC # BLD AUTO: 4.13 X10E6/UL (ref 4.14–5.8)
SODIUM SERPL-SCNC: 142 MMOL/L (ref 134–144)
TESTOST FREE SERPL-MCNC: 5.5 PG/ML (ref 6.6–18.1)
TESTOST SERPL-MCNC: 349.9 NG/DL (ref 264–916)
TSH SERPL DL<=0.005 MIU/L-ACNC: 1.96 UIU/ML (ref 0.45–4.5)
WBC # BLD AUTO: 6.7 X10E3/UL (ref 3.4–10.8)

## 2019-01-07 ENCOUNTER — OFFICE VISIT (OUTPATIENT)
Dept: DERMATOLOGY | Facility: AMBULATORY SURGERY CENTER | Age: 79
End: 2019-01-07

## 2019-01-07 VITALS
HEIGHT: 66 IN | BODY MASS INDEX: 29.73 KG/M2 | WEIGHT: 185 LBS | OXYGEN SATURATION: 98 % | TEMPERATURE: 97.9 F | HEART RATE: 64 BPM | DIASTOLIC BLOOD PRESSURE: 74 MMHG | SYSTOLIC BLOOD PRESSURE: 122 MMHG

## 2019-01-07 DIAGNOSIS — L82.1 SEBORRHEIC KERATOSES: ICD-10-CM

## 2019-01-07 DIAGNOSIS — D22.9 MULTIPLE BENIGN NEVI: ICD-10-CM

## 2019-01-07 DIAGNOSIS — L57.0 ACTINIC KERATOSES: Primary | ICD-10-CM

## 2019-01-07 DIAGNOSIS — D18.01 CHERRY ANGIOMA: ICD-10-CM

## 2019-01-07 DIAGNOSIS — L72.0 MILIAL CYST: ICD-10-CM

## 2019-01-07 RX ORDER — FLUOROURACIL 50 MG/G
CREAM TOPICAL
Qty: 40 G | Refills: 0 | Status: SHIPPED | OUTPATIENT
Start: 2019-01-07

## 2019-01-07 NOTE — PROGRESS NOTES
Written by Marli Barrow, as dictated by Renzo White, [address]. Name: Marilu Allen       Age: 77 y.o. Date: 2019    Chief Complaint:   Chief Complaint   Patient presents with    Skin Exam     spots on face        Subjective:    HPI  Mr. Marilu Allen is a 77 y.o. male who presents for a full skin exam.  The patient's last skin exam was on 18 and the patient does have current complaints related to his skin. He reports a crusty, rough, and sore lesion on his left sideburn. The lesion gets aggravated in the sunlight and is cyclic. He also reports a scaly lesion on his right cheek. He is feeling well and in his usual state of health today. He has no current illnesses, no other skin concerns. His allergies, medications, medical, and social history are reviewed by me today. The patient's pertinent skin history includes : AK s/p PDT 3/18,     ROS: Constitutional: Negative. Dermatological : positive for - skin lesion changes    Social History     Socioeconomic History    Marital status:      Spouse name: Steve Gimenez Number of children: 3    Years of education: Not on file    Highest education level: Not on file   Social Needs    Financial resource strain: Not on file    Food insecurity - worry: Not on file    Food insecurity - inability: Not on file   [employer] needs - medical: Not on file   [employer] needs - non-medical: Not on file   Occupational History    Not on file   Tobacco Use    Smoking status: Former Smoker     Last attempt to quit: 1970     Years since quittin.0    Smokeless tobacco: Never Used   Substance and Sexual Activity    Alcohol use:  Yes     Alcohol/week: 1.2 oz     Types: 2 Glasses of wine per week    Drug use: Not on file    Sexual activity: Not on file   Other Topics Concern    Not on file   Social History Narrative    Not on file       Family History   Problem Relation Age of Onset    Heart Disease Father 46  age 46    Cancer Brother         kidney    Diabetes Brother        Past Medical History:   Diagnosis Date    AK (actinic keratosis) 2018    Dr. Marcell Desouza. blue light 2018    Anemia     hx of anemia due to food intolerance    Enlarged prostate with urinary retention     s/p ?TURP 2017 Dr. Heena [place] in [place]    GERD (gastroesophageal reflux disease)     Hyperlipidemia     took simvastatin. reluctant to take medication    Mitral valve prolapse     repair 2012. [facility]    Orchitis 2018    e coli. Dr. Tony Osei, Dr. Gee Fernandez (premature ventricular contraction) 2017    saw cardiology in [place]. echo 2017. was referred due to presumed bradycardia at urology procedure    S/P mitral valve repair 2012    Statin intolerance 2018    Sun-damaged skin     Wheat intolerance     plus Brule, Barley. saw allergist.  states not gluten intolerance. Past Surgical History:   Procedure Laterality Date    HX COLONOSCOPY      reports done [id number] range in [address] Dr. Elizabeth Mcmillan  2012    mitral valve repair, [facility] Dr. Uzma Rico    HX TURP  2017    laser? Dr. Heena [place]       Current Outpatient Medications   Medication Sig Dispense Refill    co-enzyme Q-10 (CO Q-10) 100 mg capsule Take 200 mg by mouth daily.  ASPIRIN (ASPIR-81 PO) Take 81 mg by mouth daily.  magnesium hydroxide (CHAO MILK OF MAGNESIA) 400 mg/5 mL suspension Take 15 mL by mouth daily.  PROCYANIDOLIC OLIGOMERS (PYCNOGENOL PO) Take 1 Tab by mouth daily.          Allergies   Allergen Reactions    Other Food Other (comments)     Grains- constipations    Beef Containing Products Other (comments)     constipation    Lactose Other (comments)     constipation      Pcn [Penicillins] Hives    Statins-Hmg-Coa Reductase Inhibitors Other (comments)     Declines statin due to concern of fatigue, hx of gut issues         Objective:    Visit Vitals  /74 (BP 1 Location: Right arm, BP Patient Position: Sitting)   Pulse 64   Temp 97.9 °F (36.6 °C) (Oral)   Ht 5' 6\" (1.676 m)   Wt 185 lb (83.9 kg)   SpO2 98%   BMI 29.86 kg/m²       Latrice Zhang is a 77 y.o. male who appears well and in no distress. He is awake, alert, and oriented. There is no preauricular, submandibular, or cervical lymphadenopathy. A skin examination was performed including his scalp, face (including eyelids), ears, neck, chest, back, abdomen, and upper extremities (including digits/nails). He has thin-scaled actinic keratoses on his left sideburn/ preauricular cheek and right preauricular and mid cheek. He has scattered waxy macules and keratotic papules consistent with seborrheic keratoses. There is a milia cyst on his right temple. He has pink intradermal nevi and brown junctional nevi, no concerning features for severe atypia. He has scattered red papules consistent with cherry angiomas. Assessment/Plan:    1. Actinic Keratoses. The diagnosis of this precancerous lesion related to sun exposure was reviewed. Differential field-therapy treatments were reviewed, including PDT, Efudex, and Picato. Pt opted for Picato and I prescribed the cream to treat the lesions on his left sideburn and right cheek. Use and side effects were reviewed. 2. Seborrheic keratoses. The diagnosis was reviewed and the patient was reassured that no treatment is needed for these benign lesions. 3. Milia cyst. The diagnosis was discussed. The patient was reassured that no treatment is necessary at this time. 4. Normal nevi. The diagnosis of normal nevi was reviewed. I discussed sun protection, sunscreen use, the warning signs of skin cancer, the need for self-skin examinations, and the need for regular practitioner exams every 1 year. The patient should follow up sooner as needed if new, changing, or symptomatic skin lesions arise. 5. Cherry angiomas.   The diagnosis was reviewed and the patient was reassured that no treatment is needed for these benign lesions. This plan was reviewed with the patient and patient agrees. All questions were answered. This scribe documentation was reviewed by me and accurately reflects the examination and decisions made by me.

## 2019-09-09 ENCOUNTER — OFFICE VISIT (OUTPATIENT)
Dept: CARDIOLOGY CLINIC | Age: 79
End: 2019-09-09

## 2019-09-09 VITALS
WEIGHT: 190 LBS | BODY MASS INDEX: 30.53 KG/M2 | HEIGHT: 66 IN | DIASTOLIC BLOOD PRESSURE: 80 MMHG | SYSTOLIC BLOOD PRESSURE: 130 MMHG | OXYGEN SATURATION: 98 % | HEART RATE: 64 BPM

## 2019-09-09 DIAGNOSIS — Z98.890 S/P MITRAL VALVE REPAIR: ICD-10-CM

## 2019-09-09 DIAGNOSIS — I34.1 MITRAL VALVE PROLAPSE: ICD-10-CM

## 2019-09-09 DIAGNOSIS — I49.3 PVC (PREMATURE VENTRICULAR CONTRACTION): Primary | ICD-10-CM

## 2019-09-09 NOTE — LETTER
9/18/19 Patient: Oliverio Martin YOB: 1940 Date of Visit: 9/9/2019 Kenya Bowden MD 
[place] [address] [address] [facility] [address] VIA In Basket Dear Kenya Bowden MD, Thank you for referring Mr. Dea Saint to [address] for evaluation. My notes for this consultation are attached. If you have questions, please do not hesitate to call me. I look forward to following your patient along with you. Sincerely, Junior Dunalp MD

## 2019-09-09 NOTE — PROGRESS NOTES
No cardiac complaints today per patient       Patient had ECHO done today       Visit Vitals  /80 (BP 1 Location: Right arm, BP Patient Position: Sitting)   Pulse 64   Ht 5' 6\" (1.676 m)   Wt 190 lb (86.2 kg)   SpO2 98%   BMI 30.67 kg/m²    142/84 per Echo tech

## 2019-09-09 NOTE — PROGRESS NOTES
Gael Burris MD [facility]  [address]  [address]  [place], [address] [facility]    Office [phone]  Fax [phone]  Cell [phone]        Kulwant Almanza is a [de-identified] y.o. male last seen by me 1 year ago. Assessment  Encounter Diagnoses   Name Primary?  PVC (premature ventricular contraction) Yes    Mitral valve prolapse     S/P mitral valve repair        Recommendations:    MVP with MR s/p repair 2012 at [facility]. Updated echo today demonstrates trivial MR. He has no murmur on exam. No hx of AF. Repeat echo in 1 year. He has a familial dyslipidemia and has been on and off statins for years. He reports digestive sxs at higher doses. It would be reasonable to resume statin therapy and perhaps couple it with Zetia. Defer to Dr. [facility]. - Resume ASA 81mg/d.  - He has not been taking antiobiotics prior to dental work based on recommendations from Marcos W Abdelrahman  and Dispositions    · Return in about 1 year (around 9/9/2020). Subjective:    Kulwant Almanza reports he has had a fair summer. He says his brother passed away last week. He says he feels well overall. He keeps active with golf 3x weekly and does extensive yard work with no exertional sxs. Patient denies any exertional chest pain, dyspnea, palpitations, syncope, orthopnea, edema or paroxysmal nocturnal dyspnea. Cardiac risk factors   HTN no  DM no  Smoking no    Cardiac testing  Echo 8/27/18 - EF 60%, s/p MV repair, mild-moderate excentric MR, anteriorly directed  Echo 9/9/19 - EF 60%, s/p MV repair, trace MR    Past Medical History:   Diagnosis Date    AK (actinic keratosis) 2018    Dr. Violette Sánchez. blue light 2018    Anemia     hx of anemia due to food intolerance    Enlarged prostate with urinary retention     s/p ?TURP 7/2017 Dr. Greer Ferguson in [place]    GERD (gastroesophageal reflux disease)     Hyperlipidemia     took simvastatin.   reluctant to take medication    Mitral valve prolapse     repair 2/2012. [facility]    Orchitis 2018    e coli. Dr. Rosana Zafar, Dr. Jennifer Dutta (premature ventricular contraction) 2017    saw cardiology in [place]. echo 8/2017. was referred due to presumed bradycardia at urology procedure    S/P mitral valve repair 02/2012    Statin intolerance 8/30/2018    Sun-damaged skin     Wheat intolerance     plus [place], Barley. saw allergist.  states not gluten intolerance. Current Outpatient Medications   Medication Sig Dispense Refill    multivit-min/FA/lycopen/lutein (CENTRUM SILVER MEN PO) Take  by mouth.  fluorouracil (EFUDEX) 5 % chemo cream Apply a thin layer twice daily to cheek areas as discussed for total of 4 weeks 40 g 0    magnesium hydroxide (CHAO MILK OF MAGNESIA) 400 mg/5 mL suspension Take 15 mL by mouth daily.  co-enzyme Q-10 (CO Q-10) 100 mg capsule Take 200 mg by mouth daily.  PROCYANIDOLIC OLIGOMERS (PYCNOGENOL PO) Take 1 Tab by mouth daily.  ASPIRIN (ASPIR-81 PO) Take 81 mg by mouth daily. Allergies   Allergen Reactions    Other Food Other (comments)     Grains- constipations    Beef Containing Products Other (comments)     constipation    Lactose Other (comments)     constipation      Pcn [Penicillins] Hives    Statins-Hmg-Coa Reductase Inhibitors Other (comments)     Declines statin due to concern of fatigue, hx of gut issues          Review of Systems  Constitutional: Negative for fever, chills, malaise/fatigue and diaphoresis. Respiratory: Negative for cough, hemoptysis, sputum production, shortness of breath and wheezing. Cardiovascular: Negative for chest pain, palpitations, orthopnea, claudication, leg swelling and PND. Gastrointestinal: Negative for heartburn, nausea, vomiting, blood in stool and melena. Genitourinary: Negative for dysuria and flank pain. Musculoskeletal: Negative for joint pain and back pain. Skin: Negative for rash.   Neurological: Negative for focal weakness, seizures, loss of consciousness, weakness and headaches. Endo/Heme/Allergies: Does not bruise/bleed easily. Psychiatric/Behavioral: Negative for memory loss. The patient does not have insomnia. Physical Exam    Visit Vitals  /80 (BP 1 Location: Right arm, BP Patient Position: Sitting)   Pulse 64   Ht 5' 6\" (1.676 m)   Wt 190 lb (86.2 kg)   SpO2 98%   BMI 30.67 kg/m²     Wt Readings from Last 3 Encounters:   09/09/19 190 lb (86.2 kg)   09/09/19 190 lb (86.2 kg)   01/07/19 185 lb (83.9 kg)      General - well developed well nourished  Neck - JVP normal, thyroid nl  Cardiac - normal S1, S2, no murmurs, rubs or gallops. No clicks  Vascular - carotids without bruits, radials, femorals and pedal pulses equal bilateral  Lungs - clear to auscultation bilaterals, no rales, wheezing or rhonchi  Abd - soft nontender, no HSM, no abd bruits  Extremities - no edema  Skin - no rash  Neuro - nonfocal  Psych - normal mood and affect      Cardiographics  EKG 2/14/18- SR, normal  EKG 9/9/19 - SR normal  Echo 9/9/19 - EF 60%, s/p MV repair, trace MR    Written by Salud Goldstein, as dictated by Dennis Frank M.D.      Dennis Frank MD

## 2019-11-25 ENCOUNTER — HOSPITAL ENCOUNTER (OUTPATIENT)
Dept: LAB | Age: 79
Discharge: HOME | End: 2019-11-25

## 2019-11-25 ENCOUNTER — OFFICE VISIT (OUTPATIENT)
Dept: INTERNAL MEDICINE CLINIC | Age: 79
End: 2019-11-25

## 2019-11-25 ENCOUNTER — HOSPITAL ENCOUNTER (OUTPATIENT)
Dept: GENERAL RADIOLOGY | Age: 79
Discharge: HOME | End: 2019-11-25
Attending: INTERNAL MEDICINE
Payer: MEDICARE

## 2019-11-25 VITALS
HEART RATE: 70 BPM | BODY MASS INDEX: 30.37 KG/M2 | DIASTOLIC BLOOD PRESSURE: 83 MMHG | TEMPERATURE: 98 F | HEIGHT: 66 IN | OXYGEN SATURATION: 97 % | WEIGHT: 189 LBS | RESPIRATION RATE: 16 BRPM | SYSTOLIC BLOOD PRESSURE: 123 MMHG

## 2019-11-25 DIAGNOSIS — R33.8 ENLARGED PROSTATE WITH URINARY RETENTION: ICD-10-CM

## 2019-11-25 DIAGNOSIS — Z13.5 SCREENING FOR EYE CONDITION: ICD-10-CM

## 2019-11-25 DIAGNOSIS — E78.5 DYSLIPIDEMIA: ICD-10-CM

## 2019-11-25 DIAGNOSIS — Z00.00 MEDICARE ANNUAL WELLNESS VISIT, SUBSEQUENT: Primary | ICD-10-CM

## 2019-11-25 DIAGNOSIS — R41.3 MEMORY CHANGE: ICD-10-CM

## 2019-11-25 DIAGNOSIS — R93.89 ABNORMAL CXR (CHEST X-RAY): ICD-10-CM

## 2019-11-25 DIAGNOSIS — E55.9 VITAMIN D INSUFFICIENCY: ICD-10-CM

## 2019-11-25 DIAGNOSIS — Z00.00 MEDICARE ANNUAL WELLNESS VISIT, SUBSEQUENT: ICD-10-CM

## 2019-11-25 DIAGNOSIS — N40.1 ENLARGED PROSTATE WITH URINARY RETENTION: ICD-10-CM

## 2019-11-25 LAB
25(OH)D3 SERPL-MCNC: 17.8 NG/ML (ref 30–100)
ALBUMIN SERPL-MCNC: 3.8 G/DL (ref 3.5–5)
ALBUMIN/GLOB SERPL: 1.3 {RATIO} (ref 1.1–2.2)
ALP SERPL-CCNC: 74 U/L (ref 45–117)
ALT SERPL-CCNC: 23 U/L (ref 12–78)
ANION GAP SERPL CALC-SCNC: 5 MMOL/L (ref 5–15)
AST SERPL-CCNC: 14 U/L (ref 15–37)
BILIRUB SERPL-MCNC: 0.4 MG/DL (ref 0.2–1)
BUN SERPL-MCNC: 17 MG/DL (ref 6–20)
BUN/CREAT SERPL: 14 (ref 12–20)
CALCIUM SERPL-MCNC: 8.9 MG/DL (ref 8.5–10.1)
CHLORIDE SERPL-SCNC: 103 MMOL/L (ref 97–108)
CHOLEST SERPL-MCNC: 265 MG/DL
CO2 SERPL-SCNC: 31 MMOL/L (ref 21–32)
CREAT SERPL-MCNC: 1.24 MG/DL (ref 0.7–1.3)
ERYTHROCYTE [DISTWIDTH] IN BLOOD BY AUTOMATED COUNT: 12.1 % (ref 11.5–14.5)
GLOBULIN SER CALC-MCNC: 2.9 G/DL (ref 2–4)
GLUCOSE SERPL-MCNC: 95 MG/DL (ref 65–100)
HCT VFR BLD AUTO: 43.3 % (ref 36.6–50.3)
HDLC SERPL-MCNC: 43 MG/DL
HDLC SERPL: 6.2 {RATIO} (ref 0–5)
HGB BLD-MCNC: 14.1 G/DL (ref 12.1–17)
LDLC SERPL CALC-MCNC: 175.4 MG/DL (ref 0–100)
LIPID PROFILE,FLP: ABNORMAL
MCH RBC QN AUTO: 33 PG (ref 26–34)
MCHC RBC AUTO-ENTMCNC: 32.6 G/DL (ref 30–36.5)
MCV RBC AUTO: 101.4 FL (ref 80–99)
NRBC # BLD: 0 K/UL (ref 0–0.01)
NRBC BLD-RTO: 0 PER 100 WBC
PLATELET # BLD AUTO: 216 K/UL (ref 150–400)
PMV BLD AUTO: 11.1 FL (ref 8.9–12.9)
POTASSIUM SERPL-SCNC: 4.4 MMOL/L (ref 3.5–5.1)
PROT SERPL-MCNC: 6.7 G/DL (ref 6.4–8.2)
RBC # BLD AUTO: 4.27 M/UL (ref 4.1–5.7)
SODIUM SERPL-SCNC: 139 MMOL/L (ref 136–145)
TRIGL SERPL-MCNC: 233 MG/DL (ref ?–150)
VLDLC SERPL CALC-MCNC: 46.6 MG/DL
WBC # BLD AUTO: 6.5 K/UL (ref 4.1–11.1)

## 2019-11-25 PROCEDURE — 71046 X-RAY EXAM CHEST 2 VIEWS: CPT

## 2019-11-25 RX ORDER — MULTIVIT WITH MINERALS/HERBS
1 TABLET ORAL DAILY
COMMUNITY

## 2019-11-25 RX ORDER — ZOSTER VACCINE RECOMBINANT, ADJUVANTED 50 MCG/0.5
0.5 KIT INTRAMUSCULAR ONCE
Qty: 0.5 ML | Refills: 1 | Status: SHIPPED | OUTPATIENT
Start: 2019-11-25 | End: 2019-11-25

## 2019-11-25 NOTE — PROGRESS NOTES
This is a Subsequent Medicare Annual Wellness Visit providing Personalized Prevention Plan Services (PPPS) (Performed 12 months after initial AWV and PPPS )    I have reviewed the patient's medical history in detail and updated the computerized patient record. Doing well. He is golfing, walking courses. Saw Dr. Gabriel Flores 9/2019 for mitral replacement. Echo stable. Repeat 1 year. Saw derm 1/2019    Told he has region of scar tissue 4 cm on right lung on CXR dx approx age 38. Was followed by serial xrays for years and it was ok. He is curious if the spot is still present. Smoked for 20 years. Severe hyperlipidemia. Has declined med tx since \"it has not caused a problem\". Did not tolerate statins due to fatigue. Cardiology defers tx as well. Seeing urology Dr. Jeremiah Brower for BPH. Urine is no longer retained. Doing well. Reports mild memory issues at times. Word-finding. He is taking b vitamins to try to help. History     Past Medical History:   Diagnosis Date    AK (actinic keratosis) 2018    Dr. David Brar. blue light 2018    Anemia     hx of anemia due to food intolerance    Enlarged prostate with urinary retention     s/p ?TURP 7/2017 Dr. Eric Rose in [place]    GERD (gastroesophageal reflux disease)     Hyperlipidemia     statin intolernace. took simvastatin. declines medication 11/2019    Mitral valve prolapse     repair 2/2012. [facility]    Orchitis 2018    e coli. Dr. Jeremiah Brower, Dr. Pao Garnica (premature ventricular contraction) 2017    saw cardiology in [place]. echo 8/2017. was referred due to presumed bradycardia at urology procedure    S/P mitral valve repair 02/2012    Statin intolerance 8/30/2018    Sun-damaged skin     Wheat intolerance     plus [place], [place]. saw allergist.  states not gluten intolerance. Past Surgical History:   Procedure Laterality Date    HX COLONOSCOPY      reports done [id number] range in [place].    Callum Rogerter MITRAL VALVULOPLASTY  2012    mitral valve repair, [facility] Dr. Estelita Gaming    HX TURP  2017    laser? Dr. Sangita Cazares       Current Outpatient Medications   Medication Sig    hyalur ac/chond sul/colg II/AA (HYALURONIC ACID, CHOND-COLLGN, PO) Take 83 mg by mouth.  b complex vitamins tablet Take 1 Tab by mouth daily.  OTHER Ricardo-zyme  Digestive & systemic Enzymes    B infantis/B ani/B wm/B bifid (PROBIOTIC 4X PO) Take  by mouth.  multivit-min/FA/lycopen/lutein (CENTRUM SILVER MEN PO) Take  by mouth.  fluorouracil (EFUDEX) 5 % chemo cream Apply a thin layer twice daily to cheek areas as discussed for total of 4 weeks    magnesium hydroxide (CHAO MILK OF MAGNESIA) 400 mg/5 mL suspension Take 15 mL by mouth daily.  co-enzyme Q-10 (CO Q-10) 100 mg capsule Take 200 mg by mouth daily.  ASPIRIN (ASPIR-81 PO) Take 81 mg by mouth daily.  PROCYANIDOLIC OLIGOMERS (PYCNOGENOL PO) Take 1 Tab by mouth daily. No current facility-administered medications for this visit. Allergies   Allergen Reactions    Other Food Other (comments)     Grains- constipations    Beef Containing Products Other (comments)     constipation    Lactose Other (comments)     constipation      Pcn [Penicillins] Hives    Statins-Hmg-Coa Reductase Inhibitors Other (comments)     Declines statin due to concern of fatigue, hx of gut issues       Family History   Problem Relation Age of Onset    Heart Disease Father 46         age 46    [de-identified] Brother         kidney    Diabetes Brother         reports that he quit smoking about 49 years ago. He has never used smokeless tobacco.   reports current alcohol use of about 2.0 standard drinks of alcohol per week. Depression Risk Factor Screening:       Alcohol Risk Factor Screening: On any occasion during the past 3 months, have you had more than 3 drinks containing alcohol? No    Do you average more than 14 drinks per week? No      Functional Ability and Level of Safety:     Hearing Loss   mild    Activities of Daily Living   Self-care. Requires assistance with: no ADLs      Fall Risk     Fall Risk Assessment, last 12 mths 11/25/2019   Able to walk? Yes   Fall in past 12 months? No         Abuse Screen   Patient is not abused    Review of Systems   A comprehensive review of systems was negative except for that written in the HPI. Physical Examination     Evaluation of Cognitive Function:  Mood/affect:  neutral, happy    Appearance: age appropriate  Family member/caregiver input: none    Blood pressure 123/83, pulse 70, temperature 98 °F (36.7 °C), temperature source Oral, resp. rate 16, height 5' 6\" (1.676 m), weight 189 lb (85.7 kg), SpO2 97 %. General appearance: alert, cooperative, no distress, appears stated age  Neck: supple, symmetrical, trachea midline, no adenopathy, thyroid: not enlarged, symmetric, no tenderness/mass/nodules, no carotid bruit and no JVD  Lungs: clear to auscultation bilaterally  Heart: regular rate and rhythm, S1, S2 normal, no murmur, click, rub or gallop  Extremities: extremities normal, atraumatic, no cyanosis or edema    Patient Care Team:  Brian Tello MD as PCP - General (Internal Medicine)  Brian Tello MD as PCP - [facility] Provider  Ivan Arredondo MD as Physician (Cardiology)      Advice/Referrals/Counseling   Education and counseling provided. See below for specific orders    Assessment/Plan   . Diagnoses and all orders for this visit:    1. Medicare annual wellness visit, subsequent  -       -     SHINGRIX, PF, 50 mcg/0.5 mL susr injection; 0.5 mL by IntraMUSCular route once for 1 dose. Receive 2nd dose in 2-6 months. For Shingles (Zoster) prevention  -     CBC W/O DIFF; Future    2. Dyslipidemia - severe, familial.  Declined any med therapy for primary prevention. He is aware of the association of elevated lipids w CV dz.    -     METABOLIC PANEL, COMPREHENSIVE;  Future  - LIPID PANEL; Future    3. Enlarged prostate with urinary retention  stable    4. Screening for eye condition  -     REFERRAL TO OPHTHALMOLOGY    5. Abnormal CXR (chest x-ray) - past hx, per Pt. CXR today is normal.  Could consider chest CT for cancer screening w 20 pack-year hx. Currently asymptomatic  -     XR CHEST PA LAT; Future    6. Vitamin D insufficiency  -     VITAMIN D, 25 HYDROXY; Future    7. Memory change  Mild. Taking B vit. Consider additional w/u if worsening        Potential medication side effects were discussed with the patient; let me know if any occur.   Return for yearly Annual Wellness Visits

## 2019-11-25 NOTE — PROGRESS NOTES
1. Have you been to the ER, urgent care clinic since your last visit? Hospitalized since your last visit? No    2. Have you seen or consulted any other health care providers outside of the [address] [place] [address] since your last visit? Include any pap smears or colon screening. No     Chief Complaint   Patient presents with    Complete Physical    Referral Request     Patient reports would like to get chest X-ray due to scar tissue on lung.        Visit Vitals  /83 (BP 1 Location: Left arm, BP Patient Position: Sitting)   Pulse 70   Temp 98 °F (36.7 °C) (Oral)   Resp 16   Ht 5' 6\" (1.676 m)   Wt 189 lb (85.7 kg)   SpO2 97%   BMI 30.51 kg/m²       Health Maintenance Due   Topic Date Due    DTaP/Tdap/Td series (1 - Tdap) 08/05/1951    Shingrix Vaccine Age 50> (1 of 2) 08/05/1989    GLAUCOMA SCREENING Q2Y  08/05/2004    Pneumococcal 65+ years (1 of 1 - PPSV23) 08/05/2004    MEDICARE YEARLY EXAM  03/14/2018

## 2019-12-09 RX ORDER — ASPIRIN 325 MG
50000 TABLET, DELAYED RELEASE (ENTERIC COATED) ORAL
Qty: 8 CAP | Refills: 0 | Status: SHIPPED | OUTPATIENT
Start: 2019-12-09 | End: 2020-01-28

## 2019-12-11 ENCOUNTER — OFFICE VISIT (OUTPATIENT)
Dept: INTERNAL MEDICINE CLINIC | Age: 79
End: 2019-12-11

## 2019-12-11 VITALS
HEART RATE: 67 BPM | HEIGHT: 66 IN | WEIGHT: 191 LBS | SYSTOLIC BLOOD PRESSURE: 127 MMHG | RESPIRATION RATE: 18 BRPM | OXYGEN SATURATION: 97 % | BODY MASS INDEX: 30.7 KG/M2 | TEMPERATURE: 97.6 F | DIASTOLIC BLOOD PRESSURE: 85 MMHG

## 2019-12-11 DIAGNOSIS — E55.9 VITAMIN D DEFICIENCY: ICD-10-CM

## 2019-12-11 DIAGNOSIS — E78.5 DYSLIPIDEMIA: ICD-10-CM

## 2019-12-11 DIAGNOSIS — K59.09 CHRONIC CONSTIPATION: ICD-10-CM

## 2019-12-11 DIAGNOSIS — R71.8 ELEVATED MCV: Primary | ICD-10-CM

## 2019-12-11 RX ORDER — CHOLECALCIFEROL (VITAMIN D3) 125 MCG
5000 CAPSULE ORAL DAILY
Qty: 30 CAP | Refills: 5
Start: 2019-12-11

## 2019-12-11 NOTE — PATIENT INSTRUCTIONS
Body Mass Index: Care Instructions Your Care Instructions Body mass index (BMI) can help you see if your weight is raising your risk for health problems. It uses a formula to compare how much you weigh with how tall you are. · A BMI lower than 18.5 is considered underweight. · A BMI between 18.5 and 24.9 is considered healthy. · A BMI between 25 and 29.9 is considered overweight. A BMI of 30 or higher is considered obese. If your BMI is in the normal range, it means that you have a lower risk for weight-related health problems. If your BMI is in the overweight or obese range, you may be at increased risk for weight-related health problems, such as high blood pressure, heart disease, stroke, arthritis or joint pain, and diabetes. If your BMI is in the underweight range, you may be at increased risk for health problems such as fatigue, lower protection (immunity) against illness, muscle loss, bone loss, hair loss, and hormone problems. BMI is just one measure of your risk for weight-related health problems. You may be at higher risk for health problems if you are not active, you eat an unhealthy diet, or you drink too much alcohol or use tobacco products. Follow-up care is a key part of your treatment and safety. Be sure to make and go to all appointments, and call your doctor if you are having problems. It's also a good idea to know your test results and keep a list of the medicines you take. How can you care for yourself at home? · Practice healthy eating habits. This includes eating plenty of fruits, vegetables, whole grains, lean protein, and low-fat dairy. · If your doctor recommends it, get more exercise. Walking is a good choice. Bit by bit, increase the amount you walk every day. Try for at least 30 minutes on most days of the week. · Do not smoke. Smoking can increase your risk for health problems.  If you need help quitting, talk to your doctor about stop-smoking programs and medicines. These can increase your chances of quitting for good. · Limit alcohol to 2 drinks a day for men and 1 drink a day for women. Too much alcohol can cause health problems. If you have a BMI higher than 25 · Your doctor may do other tests to check your risk for weight-related health problems. This may include measuring the distance around your waist. A waist measurement of more than 40 inches in men or 35 inches in women can increase the risk of weight-related health problems. · Talk with your doctor about steps you can take to stay healthy or improve your health. You may need to make lifestyle changes to lose weight and stay healthy, such as changing your diet and getting regular exercise. If you have a BMI lower than 18.5 · Your doctor may do other tests to check your risk for health problems. · Talk with your doctor about steps you can take to stay healthy or improve your health. You may need to make lifestyle changes to gain or maintain weight and stay healthy, such as getting more healthy foods in your diet and doing exercises to build muscle. Where can you learn more? Go to http://natty-keenan.info/. Enter S176 in the search box to learn more about \"Body Mass Index: Care Instructions. \" Current as of: October 13, 2016 Content Version: 11.4 © 2463-0140 [employer]wise[employer]. Care instructions adapted under license by [employer] (which disclaims liability or warranty for this information). If you have questions about a medical condition or this instruction, always ask your healthcare professional. [address] any warranty or liability for your use of this information.

## 2019-12-11 NOTE — PROGRESS NOTES
HISTORY OF PRESENT ILLNESS    Chief Complaint   Patient presents with    Labs     go over results from couple weeks ago       Presents for follow-up    He wishes to discuss his labs done November 25. MCV mildly elevated, 101.4. Hemoglobin 14.4, WBC 6.5. He asks why his MCV could be elevated. He does take a vitamin B12 supplement which is sublingual over the last couple of years. Was never told he was B12 deficient. Vitamin D deficiency. He has been taking  2000 units of vitamin D daily prior to the lab draw. Reports chronic constipation. He takes milk of magnesia 30 mL's per day which does help. Asks if there could be any long-term consequences. Has been taking this for over 20 years. Reassured that chest x-ray is normal.  Previous history of possible scarring or abnormality    Review of Systems   All other systems reviewed and are negative, except as noted in HPI    Past Medical and Surgical History   has a past medical history of AK (actinic keratosis) (2018), Anemia, Enlarged prostate with urinary retention, GERD (gastroesophageal reflux disease), Hyperlipidemia, Mitral valve prolapse, Orchitis (2018), PVC (premature ventricular contraction) (2017), S/P mitral valve repair (02/2012), Statin intolerance (8/30/2018), Sun-damaged skin, and Wheat intolerance. He also has no past medical history of Arsenic suspected exposure, Family history of skin cancer, Radiation exposure, Skin cancer, Sunburn, blistering, or Tanning bed exposure. has a past surgical history that includes hx mitral valvuloplasty (02/2012); hx turp (07/2017); and hx colonoscopy (2012). reports that he quit smoking about 49 years ago. He has never used smokeless tobacco. He reports current alcohol use of about 2.0 standard drinks of alcohol per week. family history includes Cancer in his brother; Diabetes in his brother; Heart Disease (age of onset: 46) in his father.     Physical Exam   Nursing note and vitals reviewed. Blood pressure 127/85, pulse 67, temperature 97.6 °F (36.4 °C), temperature source Oral, resp. rate 18, height 5' 6\" (1.676 m), weight 191 lb (86.6 kg), SpO2 97 %. Constitutional:  No distress. Eyes: Conjunctivae are normal.   Ears:  Hearing grossly intact  Cardiovascular: Normal rate. regular rhythm, no murmurs or gallops  No edema  Pulmonary/Chest: Effort normal.   CTAB  Musculoskeletal: moves all 4 extremities   Neurological: Alert and oriented to person, place, and time. Skin: No rash noted. Psychiatric: Normal mood and affect. Behavior is normal.     ASSESSMENT and PLAN  Diagnoses and all orders for this visit:    1. Elevated MCV  Isolated, mild. Taking vitamin B12 sublingual.  Will check B12 levels with next lab. He is not anemic.  -     VITAMIN B12 & FOLATE; Future    2. Vitamin D deficiency  Severe deficiency. Take 50,000 units weekly for 8 weeks then change to 5000 units daily. -     cholecalciferol (DIALYVITE VITAMIN D) (5000 Units /125 mcg) capsule; Take 1 Cap by mouth daily. Start after completing 8 weeks of 50,000 units weekly  -     VITAMIN D, 25 HYDROXY; Future    3. Chronic constipation  Controlled with milk of magnesia and for a long time. -     MAGNESIUM; Future    4. Dyslipidemia  He has read studies about how starting a statin in people above 75 may not have an indication. Took a statin in the past and did have some issues with myalgias. No history of cardiovascular disease. He is known prefers not to resume medication at this time at primary prevention. Counseled that this is reasonable. Patient Instructions          Body Mass Index: Care Instructions  Your Care Instructions    Body mass index (BMI) can help you see if your weight is raising your risk for health problems. It uses a formula to compare how much you weigh with how tall you are. · A BMI lower than 18.5 is considered underweight. · A BMI between 18.5 and 24.9 is considered healthy.   · A BMI between 25 and 29.9 is considered overweight. A BMI of 30 or higher is considered obese. If your BMI is in the normal range, it means that you have a lower risk for weight-related health problems. If your BMI is in the overweight or obese range, you may be at increased risk for weight-related health problems, such as high blood pressure, heart disease, stroke, arthritis or joint pain, and diabetes. If your BMI is in the underweight range, you may be at increased risk for health problems such as fatigue, lower protection (immunity) against illness, muscle loss, bone loss, hair loss, and hormone problems. BMI is just one measure of your risk for weight-related health problems. You may be at higher risk for health problems if you are not active, you eat an unhealthy diet, or you drink too much alcohol or use tobacco products. Follow-up care is a key part of your treatment and safety. Be sure to make and go to all appointments, and call your doctor if you are having problems. It's also a good idea to know your test results and keep a list of the medicines you take. How can you care for yourself at home? · Practice healthy eating habits. This includes eating plenty of fruits, vegetables, whole grains, lean protein, and low-fat dairy. · If your doctor recommends it, get more exercise. Walking is a good choice. Bit by bit, increase the amount you walk every day. Try for at least 30 minutes on most days of the week. · Do not smoke. Smoking can increase your risk for health problems. If you need help quitting, talk to your doctor about stop-smoking programs and medicines. These can increase your chances of quitting for good. · Limit alcohol to 2 drinks a day for men and 1 drink a day for women. Too much alcohol can cause health problems. If you have a BMI higher than 25  · Your doctor may do other tests to check your risk for weight-related health problems.  This may include measuring the distance around your waist. A waist measurement of more than 40 inches in men or 35 inches in women can increase the risk of weight-related health problems. · Talk with your doctor about steps you can take to stay healthy or improve your health. You may need to make lifestyle changes to lose weight and stay healthy, such as changing your diet and getting regular exercise. If you have a BMI lower than 18.5  · Your doctor may do other tests to check your risk for health problems. · Talk with your doctor about steps you can take to stay healthy or improve your health. You may need to make lifestyle changes to gain or maintain weight and stay healthy, such as getting more healthy foods in your diet and doing exercises to build muscle. Where can you learn more? Go to http://natty-keenan.info/. Enter S176 in the search box to learn more about \"Body Mass Index: Care Instructions. \"  Current as of: October 13, 2016  Content Version: 11.4  © 8013-9507 [employer]. Care instructions adapted under license by [employer] (which disclaims liability or warranty for this information). If you have questions about a medical condition or this instruction, always ask your healthcare professional. [place] [address] any warranty or liability for your use of this information. lab results and schedule of future lab studies reviewed with patient  reviewed medications and side effects in detail    Return to clinic for further evaluation if new symptoms develop        Current Outpatient Medications   Medication Sig    cholecalciferol (DIALYVITE VITAMIN D) (5000 Units /125 mcg) capsule Take 1 Cap by mouth daily. Start after completing 8 weeks of 50,000 units weekly    cholecalciferol (VITAMIN D3) (50,000 UNITS /1250 MCG) capsule Take 1 Cap by mouth every seven (7) days for 8 doses. For 8 weeks    b complex vitamins tablet Take 1 Tab by mouth daily.     OTHER Ricardo-zyme  Digestive & systemic Enzymes    B infantis/B ani/B wm/B bifid (PROBIOTIC 4X PO) Take  by mouth.  multivit-min/FA/lycopen/lutein (CENTRUM SILVER MEN PO) Take  by mouth.  fluorouracil (EFUDEX) 5 % chemo cream Apply a thin layer twice daily to cheek areas as discussed for total of 4 weeks (Patient taking differently: as needed.)    magnesium hydroxide (CHAO MILK OF MAGNESIA) 400 mg/5 mL suspension Take 15 mL by mouth daily.  co-enzyme Q-10 (CO Q-10) 100 mg capsule Take 200 mg by mouth daily.  ASPIRIN (ASPIR-81 PO) Take 81 mg by mouth daily.  PROCYANIDOLIC OLIGOMERS (PYCNOGENOL PO) Take 1 Tab by mouth daily. No current facility-administered medications for this visit.

## 2020-02-11 ENCOUNTER — TELEPHONE (OUTPATIENT)
Dept: INTERNAL MEDICINE CLINIC | Age: 80
End: 2020-02-11

## 2020-02-11 ENCOUNTER — HOSPITAL ENCOUNTER (OUTPATIENT)
Dept: LAB | Age: 80
Discharge: HOME | End: 2020-02-11

## 2020-02-11 DIAGNOSIS — K59.09 CHRONIC CONSTIPATION: ICD-10-CM

## 2020-02-11 DIAGNOSIS — E55.9 VITAMIN D DEFICIENCY: ICD-10-CM

## 2020-02-11 DIAGNOSIS — R71.8 ELEVATED MCV: ICD-10-CM

## 2020-02-11 LAB
25(OH)D3 SERPL-MCNC: 55.9 NG/ML (ref 30–100)
FOLATE SERPL-MCNC: 30.4 NG/ML (ref 5–21)
MAGNESIUM SERPL-MCNC: 2.5 MG/DL (ref 1.6–2.4)
VIT B12 SERPL-MCNC: 1788 PG/ML (ref 193–986)

## 2020-02-11 NOTE — TELEPHONE ENCOUNTER
Pt left information for the doctor and would like it noted in his chart. I placed it in the doctor's mailbox at the .     Thank you

## 2020-09-11 NOTE — PROGRESS NOTES
Gael Newman MD [facility]  [address]  [place], [address] [facility]    Office [phone]  Fax [phone]  Cell [phone]        Daja Szymanski is a 79 y.o. male last seen by me 1 year ago. Assessment  Encounter Diagnoses   Name Primary?  S/P mitral valve repair     Non-rheumatic mitral regurgitation Yes    Dyslipidemia     Mitral valve prolapse     PVC (premature ventricular contraction)     Statin intolerance        Recommendations:    MVP with MR s/p repair 2012 at [facility]. Updated echo today demonstrates trivial MR. He has no murmur on exam. No hx of AF.   - Continue annual echo surveillance  - We discussed antibiotic prophylaxis. Based on recommendations from [facility], he has not been doing this since 2 years post-op    Familial dyslipidemia. On and off statins for years. Diigestive issues noted. Pre-op cath normal. He does not wish to resume statin therapy at this time. Follow-up and Dispositions    · Return in about 1 year (around 9/14/2021). Subjective:    Daja Szymanski reports no interval cardiac sxs, but he does note some decrease in stamina. Patient denies any exertional chest pain, dyspnea, palpitations, syncope, orthopnea, edema or paroxysmal nocturnal dyspnea. He keeps active with 18 holes of golf 3x weekly at [employer] and does yard work with no exertional sxs. Lipids followed by Melissa Yeboah MD, but he has not seen him since January. Cardiac risk factors   HTN no  DM no  Smoking no    Cardiac testing  Echo 8/27/18 - EF 60%, s/p MV repair, mild-moderate excentric MR, anteriorly directed  Echo 9/9/19 - EF 60%, s/p MV repair, trace MR  Echo 9/14/20- EF 60%, s/p MV repair, mild MR    Past Medical History:   Diagnosis Date    AK (actinic keratosis) 2018    Dr. Princess Navarrete.   blue light 2018    Anemia     hx of anemia due to food intolerance    Enlarged prostate with urinary retention     s/p ?TURP 7/2017 Dr. De Leon Doctor in [facility]    GERD (gastroesophageal reflux disease)     Hyperlipidemia     statin intolernace. took simvastatin. declines medication 11/2019    Mitral valve prolapse     repair 2/2012. [facility]    Orchitis 2018    e coli. Dr. Noemi Chavez, Dr. Erica Long (premature ventricular contraction) 2017    saw cardiology in [facility]. echo 8/2017. was referred due to presumed bradycardia at urology procedure    S/P mitral valve repair 02/2012    Statin intolerance 8/30/2018    Sun-damaged skin     Wheat intolerance     plus [place], [place]. saw allergist.  states not gluten intolerance. Current Outpatient Medications   Medication Sig Dispense Refill    cholecalciferol (DIALYVITE VITAMIN D) (5000 Units /125 mcg) capsule Take 1 Cap by mouth daily. Start after completing 8 weeks of 50,000 units weekly (Patient taking differently: Take 5,000 Units by mouth daily.) 30 Cap 5    b complex vitamins tablet Take 1 Tab by mouth daily.  OTHER Ricardo-zyme  Digestive & systemic Enzymes      B infantis/B ani/B wm/B bifid (PROBIOTIC 4X PO) Take  by mouth.  multivit-min/FA/lycopen/lutein (CENTRUM SILVER MEN PO) Take  by mouth.  fluorouracil (EFUDEX) 5 % chemo cream Apply a thin layer twice daily to cheek areas as discussed for total of 4 weeks (Patient taking differently: as needed.) 40 g 0    magnesium hydroxide (CHAO MILK OF MAGNESIA) 400 mg/5 mL suspension Take 15 mL by mouth daily.  co-enzyme Q-10 (CO Q-10) 100 mg capsule Take 200 mg by mouth daily.  ASPIRIN (ASPIR-81 PO) Take 81 mg by mouth daily.  PROCYANIDOLIC OLIGOMERS (PYCNOGENOL PO) Take 1 Tab by mouth daily.          Allergies   Allergen Reactions    Other Food Other (comments)     Grains- constipations    Beef Containing Products Other (comments)     constipation    Lactose Other (comments)     constipation      Pcn [Penicillins] Hives    Statins-Hmg-Coa Reductase Inhibitors Other (comments)     Declines statin due to concern of fatigue, hx of gut issues          Review of Systems  Constitutional: Negative for fever, chills, and diaphoresis. +decreased stamina  Respiratory: Negative for cough, hemoptysis, sputum production, shortness of breath and wheezing. Cardiovascular: Negative for chest pain, palpitations, orthopnea, claudication, leg swelling and PND. Gastrointestinal: Negative for heartburn, nausea, vomiting, blood in stool and melena. Genitourinary: Negative for dysuria and flank pain. Musculoskeletal: Negative for joint pain and back pain. Skin: Negative for rash. Neurological: Negative for focal weakness, seizures, loss of consciousness, weakness and headaches. Endo/Heme/Allergies: Does not bruise/bleed easily. Psychiatric/Behavioral: Negative for memory loss. The patient does not have insomnia. Physical Exam    Visit Vitals  /82 (BP 1 Location: Left arm, BP Patient Position: Sitting)   Pulse 71   Resp 16   Ht 5' 6\" (1.676 m)   Wt 194 lb 12.8 oz (88.4 kg)   SpO2 97%   BMI 31.44 kg/m²     Wt Readings from Last 3 Encounters:   09/14/20 194 lb 12.8 oz (88.4 kg)   09/14/20 194 lb 12.8 oz (88.4 kg)   12/11/19 191 lb (86.6 kg)      General - well developed well nourished  Neck - JVP normal, thyroid nl  Cardiac - normal S1, S2, no murmurs, rubs or gallops. No clicks  Vascular - carotids without bruits, radials, femorals and pedal pulses equal bilateral  Lungs - clear to auscultation bilaterals, no rales, wheezing or rhonchi  Abd - soft nontender, no HSM, no abd bruits  Extremities - no edema  Skin - no rash  Neuro - nonfocal  Psych - normal mood and affect      Cardiographics  EKG 2/14/18- SR, normal  EKG 9/9/19 - SR normal  Echo 9/9/19 - EF 60%, s/p MV repair, trace MR  EKG 9/14/20- SR, normal  Echo 9/14/20- EF 60%, s/p MV repair, mild MR    Written by Vaishnavi Burton, as dictated by Mouna Harp M.D.      Mouna Harp MD

## 2020-09-14 ENCOUNTER — OFFICE VISIT (OUTPATIENT)
Dept: CARDIOLOGY CLINIC | Age: 80
End: 2020-09-14
Payer: MEDICARE

## 2020-09-14 ENCOUNTER — ANCILLARY PROCEDURE (OUTPATIENT)
Dept: CARDIOLOGY CLINIC | Age: 80
End: 2020-09-14
Payer: MEDICARE

## 2020-09-14 VITALS
SYSTOLIC BLOOD PRESSURE: 138 MMHG | RESPIRATION RATE: 16 BRPM | OXYGEN SATURATION: 97 % | BODY MASS INDEX: 31.31 KG/M2 | HEIGHT: 66 IN | WEIGHT: 194.8 LBS | HEART RATE: 71 BPM | DIASTOLIC BLOOD PRESSURE: 82 MMHG

## 2020-09-14 VITALS
HEIGHT: 66 IN | SYSTOLIC BLOOD PRESSURE: 142 MMHG | DIASTOLIC BLOOD PRESSURE: 80 MMHG | BODY MASS INDEX: 31.31 KG/M2 | WEIGHT: 194.8 LBS

## 2020-09-14 DIAGNOSIS — Z78.9 STATIN INTOLERANCE: ICD-10-CM

## 2020-09-14 DIAGNOSIS — I34.1 MITRAL VALVE PROLAPSE: ICD-10-CM

## 2020-09-14 DIAGNOSIS — I34.0 NON-RHEUMATIC MITRAL REGURGITATION: Primary | ICD-10-CM

## 2020-09-14 DIAGNOSIS — I34.0 NON-RHEUMATIC MITRAL REGURGITATION: ICD-10-CM

## 2020-09-14 DIAGNOSIS — E78.5 DYSLIPIDEMIA: ICD-10-CM

## 2020-09-14 DIAGNOSIS — Z98.890 S/P MITRAL VALVE REPAIR: ICD-10-CM

## 2020-09-14 DIAGNOSIS — I49.3 PVC (PREMATURE VENTRICULAR CONTRACTION): ICD-10-CM

## 2020-09-14 PROCEDURE — G8432 DEP SCR NOT DOC, RNG: HCPCS | Performed by: SPECIALIST

## 2020-09-14 PROCEDURE — G8417 CALC BMI ABV UP PARAM F/U: HCPCS | Performed by: SPECIALIST

## 2020-09-14 PROCEDURE — 99214 OFFICE O/P EST MOD 30 MIN: CPT | Performed by: SPECIALIST

## 2020-09-14 PROCEDURE — G0463 HOSPITAL OUTPT CLINIC VISIT: HCPCS | Performed by: SPECIALIST

## 2020-09-14 PROCEDURE — 93005 ELECTROCARDIOGRAM TRACING: CPT | Performed by: SPECIALIST

## 2020-09-14 PROCEDURE — 1101F PT FALLS ASSESS-DOCD LE1/YR: CPT | Performed by: SPECIALIST

## 2020-09-14 PROCEDURE — 93010 ELECTROCARDIOGRAM REPORT: CPT | Performed by: SPECIALIST

## 2020-09-14 PROCEDURE — G8427 DOCREV CUR MEDS BY ELIG CLIN: HCPCS | Performed by: SPECIALIST

## 2020-09-14 PROCEDURE — 93306 TTE W/DOPPLER COMPLETE: CPT | Performed by: SPECIALIST

## 2020-09-14 PROCEDURE — G8536 NO DOC ELDER MAL SCRN: HCPCS | Performed by: SPECIALIST

## 2020-09-14 NOTE — PROGRESS NOTES
Kezia Hurtado is a 79 y.o. male    Chief Complaint   Patient presents with    Annual Exam     S/P MVR       Chest pain : NO  SOB : NO  Dizziness : NO  Edema : NO  Refills : NO    Visit Vitals  /82 (BP 1 Location: Left arm, BP Patient Position: Sitting)   Pulse 71   Resp 16   Ht 5' 6\" (1.676 m)   Wt 194 lb 12.8 oz (88.4 kg)   SpO2 97%   BMI 31.44 kg/m²       1. Have you been to the ER, urgent care clinic since your last visit? Hospitalized since your last visit? No    2. Have you seen or consulted any other health care providers outside of the [address] [place] [address] since your last visit? Include any pap smears or colon screening.  No

## 2020-09-14 NOTE — LETTER
9/14/20 Patient: Ziyad Obrien YOB: 1940 Date of Visit: 9/14/2020 Lexi Chandler MD 
[place] [address] [address] [facility] [id number] VIA In Basket Dear Lexi Chandler MD, Thank you for referring Mr. Kaden Nogueira to [address] for evaluation. My notes for this consultation are attached. If you have questions, please do not hesitate to call me. I look forward to following your patient along with you. Sincerely, Lexi Delgado MD

## 2020-09-15 LAB
ECHO AO ASC DIAM: 3.38 CM
ECHO AO ROOT DIAM: 3.69 CM
ECHO AV AREA PEAK VELOCITY: 3.06 CM2
ECHO AV AREA VTI: 3.19 CM2
ECHO AV AREA/BSA PEAK VELOCITY: 1.5 CM2/M2
ECHO AV AREA/BSA VTI: 1.6 CM2/M2
ECHO AV MEAN GRADIENT: 2.94 MMHG
ECHO AV PEAK GRADIENT: 5.88 MMHG
ECHO AV PEAK VELOCITY: 121.2 CM/S
ECHO AV PEAK VELOCITY: 121.2 CM/S
ECHO AV VTI: 22.18 CM
ECHO LA AREA 4C: 16.02 CM2
ECHO LA MAJOR AXIS: 3.73 CM
ECHO LA MINOR AXIS: 1.89 CM
ECHO LA VOL 2C: 60.88 ML (ref 18–58)
ECHO LA VOL 4C: 45.69 ML (ref 18–58)
ECHO LA VOL BP: 55.08 ML (ref 18–58)
ECHO LA VOL/BSA BIPLANE: 27.85 ML/M2 (ref 16–28)
ECHO LA VOLUME INDEX A2C: 30.78 ML/M2 (ref 16–28)
ECHO LA VOLUME INDEX A4C: 23.1 ML/M2 (ref 16–28)
ECHO LV E' LATERAL VELOCITY: 5.73 CM/S
ECHO LV E' SEPTAL VELOCITY: 4.66 CM/S
ECHO LV EDV A4C: 115.01 ML
ECHO LV EDV INDEX A4C: 58.1 ML/M2
ECHO LV EJECTION FRACTION A4C: 60 PERCENT
ECHO LV ESV A4C: 46.39 ML
ECHO LV ESV INDEX A4C: 23.5 ML/M2
ECHO LV INTERNAL DIMENSION DIASTOLIC: 4.83 CM (ref 4.2–5.9)
ECHO LV INTERNAL DIMENSION SYSTOLIC: 3.2 CM
ECHO LV IVSD: 0.97 CM (ref 0.6–1)
ECHO LV MASS 2D: 173.1 G (ref 88–224)
ECHO LV MASS INDEX 2D: 87.5 G/M2 (ref 49–115)
ECHO LV POSTERIOR WALL DIASTOLIC: 1.04 CM (ref 0.6–1)
ECHO LVOT DIAM: 2.31 CM
ECHO LVOT PEAK GRADIENT: 3.14 MMHG
ECHO LVOT PEAK VELOCITY: 88.67 CM/S
ECHO LVOT SV: 70.7 ML
ECHO LVOT VTI: 16.9 CM
ECHO MV A VELOCITY: 106.33 CM/S
ECHO MV E DECELERATION TIME (DT): 0.22 S
ECHO MV E VELOCITY: 75.15 CM/S
ECHO MV E/A RATIO: 0.71
ECHO MV E/E' LATERAL: 13.12
ECHO MV E/E' RATIO (AVERAGED): 14.62
ECHO MV E/E' SEPTAL: 16.13
ECHO MV MAX VELOCITY: 109.2 CM/S
ECHO MV MEAN GRADIENT: 1.6 MMHG
ECHO MV PEAK GRADIENT: 4.77 MMHG
ECHO MV PRESSURE HALF TIME (PHT): 0.06 S
ECHO MV VTI: 26.53 CM
ECHO RV INTERNAL DIMENSION: 4.26 CM
ECHO TV REGURGITANT MAX VELOCITY: 232.9 CM/S
ECHO TV REGURGITANT PEAK GRADIENT: 20.18 MMHG
ECHO TV REGURGITANT PEAK GRADIENT: 21.7 MMHG
ECHO TV REGURGITANT PEAK GRADIENT: 21.7 MMHG

## 2022-03-16 ENCOUNTER — NURSE TRIAGE (OUTPATIENT)
Dept: OTHER | Facility: CLINIC | Age: 82
End: 2022-03-16

## 2022-03-16 NOTE — TELEPHONE ENCOUNTER
Received call from Olga Richmond at [facility] with Red Flag Complaint. Subjective: Caller states \"He is having some dizziness and vertigo. He has had heart valve repair in the past and we are concerned he might be having a stroke. \"     Current Symptoms: dizziness-room spinning, having a hard time walking across the room unless holding on to something. He had a bad case of vertigo yesterday. He is very weak(generalized). .  He has had some N/V(vomited x 1, but having dry heaves). Head movements bring the dizziness. Has not felt like he was going to pass out, but he sits in the chair and falls asleep. He's just trying to keep his eyes closed to keep from being dizzy. B/P 130/69 pulse 64-70. Has urinated this morning, drinking fluids. Diarrhea- 1 episode of watery    Onset: 1 day ago; unsure if worsening or staying the same. Associated Symptoms: reduced activity, reduced appetite, diarrhea    Pain Severity: 0/10; Temperature: unknown    What has been tried: ginger ale, coffee    LMP: NA Pregnant: NA    Recommended disposition: Go to ED/UCC Now (Or to Office with PCP Approval)      0840- Call Jana Becker-Spoke with [facility]. Warm transferred patient to her. Care advice provided, patient verbalizes understanding; denies any other questions or concerns; instructed to call back for any new or worsening symptoms. Attention Provider: Thank you for allowing me to participate in the care of your patient. The patient was connected to triage in response to information provided to the [facility]. Please do not respond through this encounter as the response is not directed to a shared pool.       Reason for Disposition   Spinning or tilting sensation (vertigo) present now and one or more stroke risk factors (i.e., hypertension, diabetes mellitus, prior stroke/TIA, heart attack, age over 61) (Exception: prior physician evaluation for this AND no different/worse than usual)    Protocols used: DIZZINESS-ADULT-[place]

## 2022-03-19 PROBLEM — Z78.9 STATIN INTOLERANCE: Status: ACTIVE | Noted: 2018-08-30

## 2022-03-19 PROBLEM — I34.0 NON-RHEUMATIC MITRAL REGURGITATION: Status: ACTIVE | Noted: 2018-02-14

## 2022-03-19 PROBLEM — E78.5 DYSLIPIDEMIA: Status: ACTIVE | Noted: 2018-02-14

## 2022-03-19 PROBLEM — L57.0 AK (ACTINIC KERATOSIS): Status: ACTIVE | Noted: 2018-01-01

## 2022-03-19 PROBLEM — Z98.890 S/P MITRAL VALVE REPAIR: Status: ACTIVE | Noted: 2018-01-18

## 2022-03-20 PROBLEM — I49.3 PVC (PREMATURE VENTRICULAR CONTRACTION): Status: ACTIVE | Noted: 2017-01-01
